# Patient Record
Sex: MALE | Race: BLACK OR AFRICAN AMERICAN | Employment: UNEMPLOYED | URBAN - METROPOLITAN AREA
[De-identification: names, ages, dates, MRNs, and addresses within clinical notes are randomized per-mention and may not be internally consistent; named-entity substitution may affect disease eponyms.]

---

## 2018-04-02 PROBLEM — J45.20 MILD INTERMITTENT ASTHMA WITHOUT COMPLICATION: Status: ACTIVE | Noted: 2018-04-02

## 2018-04-02 PROBLEM — I10 ESSENTIAL HYPERTENSION: Status: ACTIVE | Noted: 2018-04-02

## 2018-04-02 PROBLEM — Z86.73 HISTORY OF CVA (CEREBROVASCULAR ACCIDENT): Status: ACTIVE | Noted: 2018-04-02

## 2018-04-02 PROBLEM — Z86.73 HISTORY OF STROKE: Status: ACTIVE | Noted: 2018-04-02

## 2018-04-02 PROBLEM — Z86.73 HISTORY OF CVA (CEREBROVASCULAR ACCIDENT): Status: RESOLVED | Noted: 2018-04-02 | Resolved: 2018-04-02

## 2018-06-20 ENCOUNTER — APPOINTMENT (OUTPATIENT)
Dept: GENERAL RADIOLOGY | Age: 38
End: 2018-06-20
Payer: COMMERCIAL

## 2018-06-20 ENCOUNTER — HOSPITAL ENCOUNTER (EMERGENCY)
Age: 38
Discharge: HOME | End: 2018-06-20
Attending: EMERGENCY MEDICINE
Payer: COMMERCIAL

## 2018-06-20 VITALS
DIASTOLIC BLOOD PRESSURE: 95 MMHG | OXYGEN SATURATION: 97 % | WEIGHT: 178 LBS | SYSTOLIC BLOOD PRESSURE: 130 MMHG | HEART RATE: 82 BPM | RESPIRATION RATE: 16 BRPM | BODY MASS INDEX: 22.13 KG/M2 | TEMPERATURE: 97.5 F | HEIGHT: 75 IN

## 2018-06-20 DIAGNOSIS — S80.12XA CONTUSION OF LEFT LOWER EXTREMITY, INITIAL ENCOUNTER: Primary | ICD-10-CM

## 2018-06-20 PROCEDURE — 73590 X-RAY EXAM OF LOWER LEG: CPT

## 2018-06-20 PROCEDURE — 73610 X-RAY EXAM OF ANKLE: CPT

## 2018-06-20 PROCEDURE — 99283 EMERGENCY DEPT VISIT LOW MDM: CPT

## 2018-06-20 PROCEDURE — 6370000000 HC RX 637 (ALT 250 FOR IP): Performed by: EMERGENCY MEDICINE

## 2018-06-20 RX ORDER — IBUPROFEN 800 MG/1
800 TABLET ORAL ONCE
Status: COMPLETED | OUTPATIENT
Start: 2018-06-20 | End: 2018-06-20

## 2018-06-20 RX ORDER — IBUPROFEN 800 MG/1
800 TABLET ORAL EVERY 8 HOURS PRN
Qty: 30 TABLET | Refills: 0 | Status: SHIPPED | OUTPATIENT
Start: 2018-06-20 | End: 2019-08-14

## 2018-06-20 RX ADMIN — IBUPROFEN 800 MG: 800 TABLET ORAL at 22:54

## 2018-06-20 ASSESSMENT — PAIN SCALES - GENERAL
PAINLEVEL_OUTOF10: 8
PAINLEVEL_OUTOF10: 8
PAINLEVEL_OUTOF10: 5

## 2018-06-23 PROBLEM — Z91.199 MEDICALLY NONCOMPLIANT: Status: ACTIVE | Noted: 2018-06-23

## 2018-06-23 PROBLEM — E78.00 HIGH CHOLESTEROL: Status: ACTIVE | Noted: 2018-06-23

## 2019-08-12 ENCOUNTER — TELEPHONE (OUTPATIENT)
Dept: ADMINISTRATIVE | Age: 39
End: 2019-08-12

## 2019-08-14 ENCOUNTER — OFFICE VISIT (OUTPATIENT)
Dept: PRIMARY CARE CLINIC | Age: 39
End: 2019-08-14
Payer: COMMERCIAL

## 2019-08-14 VITALS
WEIGHT: 185 LBS | DIASTOLIC BLOOD PRESSURE: 82 MMHG | OXYGEN SATURATION: 97 % | SYSTOLIC BLOOD PRESSURE: 120 MMHG | HEIGHT: 75 IN | HEART RATE: 76 BPM | BODY MASS INDEX: 23 KG/M2

## 2019-08-14 DIAGNOSIS — S93.402D SPRAIN OF LEFT ANKLE, UNSPECIFIED LIGAMENT, SUBSEQUENT ENCOUNTER: Primary | ICD-10-CM

## 2019-08-14 DIAGNOSIS — M79.672 PAIN OF LEFT HEEL: ICD-10-CM

## 2019-08-14 PROCEDURE — 99213 OFFICE O/P EST LOW 20 MIN: CPT | Performed by: FAMILY MEDICINE

## 2019-08-14 RX ORDER — IBUPROFEN 200 MG
600 TABLET ORAL EVERY 6 HOURS PRN
COMMUNITY
End: 2020-04-24 | Stop reason: ALTCHOICE

## 2019-08-14 RX ORDER — ACETAMINOPHEN 500 MG
1000 TABLET ORAL EVERY 6 HOURS PRN
COMMUNITY
End: 2020-04-24 | Stop reason: ALTCHOICE

## 2019-08-14 RX ORDER — IBUPROFEN 800 MG/1
800 TABLET ORAL
Qty: 90 TABLET | Refills: 0 | Status: SHIPPED | OUTPATIENT
Start: 2019-08-14 | End: 2020-04-24 | Stop reason: ALTCHOICE

## 2019-08-14 SDOH — HEALTH STABILITY: MENTAL HEALTH: HOW MANY STANDARD DRINKS CONTAINING ALCOHOL DO YOU HAVE ON A TYPICAL DAY?: 3 OR 4

## 2019-08-14 ASSESSMENT — PATIENT HEALTH QUESTIONNAIRE - PHQ9
2. FEELING DOWN, DEPRESSED OR HOPELESS: 0
SUM OF ALL RESPONSES TO PHQ9 QUESTIONS 1 & 2: 0
1. LITTLE INTEREST OR PLEASURE IN DOING THINGS: 0
SUM OF ALL RESPONSES TO PHQ QUESTIONS 1-9: 0
SUM OF ALL RESPONSES TO PHQ QUESTIONS 1-9: 0

## 2019-08-14 NOTE — PATIENT INSTRUCTIONS
Patient Education        Heel Pain: Care Instructions  Your Care Instructions  You can have heel pain from an injury or from everyday overuse, such as running or walking a lot. Plantar fasciitis is the most common cause of heel pain. In this condition, the bottom of your foot from the front of the heel to the base of the toes is sore and hard to walk on. Your heel can get better with rest, anti-inflammatory pain medicines, and stretching exercises. Follow-up care is a key part of your treatment and safety. Be sure to make and go to all appointments, and call your doctor if you are having problems. It's also a good idea to know your test results and keep a list of the medicines you take. How can you care for yourself at home? · Rest your feet often. Reduce your activity to a level that lets you avoid pain. If possible, do not run or walk on hard surfaces. · Take anti-inflammatory medicines to reduce heel pain. These include ibuprofen (Advil, Motrin) and naproxen (Aleve). Read and follow all instructions on the label. · Put ice or a cold pack on your heel for 10 to 20 minutes at a time. Try to do this every 1 to 2 hours for the next 3 days (when you are awake). Put a thin cloth between the ice and your skin. · If ice isn't helping after 2 or 3 days, try heat, such as a heating pad set on low. · If your doctor says it is okay, try these calf stretches. Tight calf muscles can cause heel pain or make it worse. ? Stand about 1 foot from a wall. Place the palms of both hands against the wall at chest level and lean forward against the wall. Put the leg you want to stretch about a step behind your other leg. Keep your back heel on the floor and bend your front knee until you feel a stretch in the back leg. Hold this position for 15 to 30 seconds. Repeat the exercise 2 to 4 times a session. Do 3 to 4 sessions a day. ? Sit down on the floor or a mat with your feet stretched in front of you.  Roll up a towel lengthwise, and loop it over the ball of your foot. Holding the towel at both ends, gently pull the towel toward you to stretch your foot. Hold this position for 15 to 30 seconds. Repeat the exercise 2 to 4 times a session. Do 3 to 4 sessions a day. · Wear a night splint if your doctor suggests it. A night splint holds your foot with the toes pointed up. This position gives the bottom of your foot a constant, gentle stretch. · Wear shoes with good arch support. Athletic shoes or shoes with a well-cushioned sole are good choices. · Try a heel lift, heel cup or shoe insert (orthotic) to help cushion your heel. You can buy these at many shoe stores. Use them in both shoes, even if only one foot hurts. · Maintain a healthy weight. This puts less strain on your feet. When should you call for help? Call your doctor now or seek immediate medical care if:    · You have heel pain with fever, redness, or warmth in your heel.     · You have numbness or tingling in your heel.    Watch closely for changes in your health, and be sure to contact your doctor if:    · You cannot put weight on the sore foot.     · Your heel pain lasts more than 2 weeks. Where can you learn more? Go to https://[employer]. org and sign in to your [employer] account. Enter S299 in the [facility] box to learn more about \"Heel Pain: Care Instructions. \"     If you do not have an account, please click on the \"Sign Up Now\" link. Current as of: September 23, 2018  Content Version: 12.1  © 0194-8932 [employer]. Care instructions adapted under license by [facility] ([facility]). If you have questions about a medical condition or this instruction, always ask your healthcare professional. [place] [address] any warranty or liability for your use of this information. Patient Education        Ankle Sprain: Rehab Exercises  Introduction  Here are some examples of exercises for you to try.  The exercises and relax. Repeat 8 to 12 times. 3. Then place the heel of your other foot on top of the injured one. Push down with the top heel while trying to push up with your injured foot. Hold for about 6 seconds, and relax. Repeat 8 to 12 times. Resisted ankle inversion    1. Sit on the floor with your good leg crossed over your other leg. 2. Hold both ends of an exercise band and loop the band around the inside of your affected foot. Then press your other foot against the band. 3. Keeping your legs crossed, slowly push your affected foot against the band so that foot moves away from your other foot. Then slowly relax. 4. Repeat 8 to 12 times. Resisted ankle eversion    1. Sit on the floor with your legs straight. 2. Hold both ends of an exercise band and loop the band around the outside of your affected foot. Then press your other foot against the band. 3. Keeping your leg straight, slowly push your affected foot outward against the band and away from your other foot without letting your leg rotate. Then slowly relax. 4. Repeat 8 to 12 times. Resisted ankle dorsiflexion    1. Tie the ends of an exercise band together to form a loop. Attach one end of the loop to a secure object or shut a door on it to hold it in place. (Or you can have someone hold one end of the loop to provide resistance.)  2. While sitting on the floor or in a chair, loop the other end of the band over the top of your affected foot. 3. Keeping your knee and leg straight, slowly flex your foot to pull back on the exercise band, and then slowly relax. 4. Repeat 8 to 12 times. Single-leg balance    1. Stand on a flat surface with your arms stretched out to your sides like you are making the letter \"T. \" Then lift your good leg off the floor, bending it at the knee. If you are not steady on your feet, use one hand to hold on to a chair, counter, or wall. 2. Standing on the leg with your affected ankle, keep that knee straight.  Try to range from mild to severe. The more signs of this disorder you have, the more severe it may be. Moderate to severe alcohol use disorder is sometimes called addiction. People who have it may find it hard to control their use of alcohol. People who have this disorder may argue with others about how much they're drinking. Their job may be affected because of drinking. They may drink when it's dangerous or illegal, such as when they drive. They also may have a strong need, or craving, to drink. They may feel like they must drink just to get by. Their drinking may increase their risk of getting hurt or being in a car crash. Over time, drinking too much alcohol may cause health problems. These may include high blood pressure, liver problems, or problems with digestion. What are the signs? Maybe you've wondered about your alcohol habits, or how to tell if your drinking is becoming a problem. Here are some of the signs of alcohol use disorder. You may have it if you have two or more of the following signs:  · You drink larger amounts of alcohol than you ever meant to. Or you've been drinking for a longer time than you ever meant to. · You can't cut down or control your use. Or you constantly wish you could cut down. · You spend a lot of time getting or drinking alcohol or recovering from its effects. · You have strong cravings for alcohol. · You can no longer do your main jobs at work, at school, or at home. · You keep drinking alcohol, even though your use hurts your relationships. · You have stopped doing important activities because of your alcohol use. · You drink alcohol in situations where doing so is dangerous. · You keep drinking alcohol even though you know it's causing health problems. · You need more and more alcohol to get the same effect, or you get less effect from the same amount over time. This is called tolerance. · You have uncomfortable symptoms when you stop drinking alcohol or use less.

## 2019-08-14 NOTE — PROGRESS NOTES
follow-ups on file. Orders Placed This Encounter   Procedures    XR CALCANEUS LEFT (MIN 2 VIEWS)     Standing Status:   Future     Standing Expiration Date:   8/14/2020     Order Specific Question:   Reason for exam:     Answer:   continue pain after injury    XR FOOT LEFT (2 VIEWS)     Standing Status:   Future     Standing Expiration Date:   8/14/2020     Order Specific Question:   Reason for exam:     Answer:   attention left 5th MT    Aircast Air-Stirrup Ankle Splint     Patient was prescribed a Aircast Air Stirrup Ankle Brace. The left ankle will require stabilization / immobilization from this semi-rigid / rigid orthosis to improve their function. The orthosis will assist in protecting the affected area, provide functional support and facilitate healing. The patient was educated and fit by a healthcare professional with expert knowledge and specialization in brace application while under the direct supervision of the treating physician. Verbal and written instructions for the use of and application of this item were provided. They were instructed to contact the office immediately should the brace result in increased pain, decreased sensation, increased swelling or worsening of the condition. Orders Placed This Encounter   Medications    ibuprofen (ADVIL;MOTRIN) 800 MG tablet     Sig: Take 1 tablet by mouth 3 times daily (with meals)     Dispense:  90 tablet     Refill:  0       Patient given educational materials - see patient instructions. Discussed use, benefit, and side effects of prescribed medications. All patient questions answered. Pt voiced understanding. Reviewed health maintenance. .  Patient agreed with treatment plan. Follow up as directed.      Electronicallysigned by Say Moran MD on 8/14/2019 at 10:48 AM

## 2019-12-12 ENCOUNTER — TELEPHONE (OUTPATIENT)
Dept: PRIMARY CARE CLINIC | Age: 39
End: 2019-12-12

## 2020-03-23 ENCOUNTER — TELEPHONE (OUTPATIENT)
Dept: PRIMARY CARE CLINIC | Age: 40
End: 2020-03-23

## 2020-03-23 NOTE — TELEPHONE ENCOUNTER
Pts wife is calling and states the patient is filling for FMLA due to COVID-19. Pt has a hx of asthma and strokes. Pt wants to know if you will fill out the FMLA and if they need to make a appt. Please advise.

## 2020-03-24 NOTE — TELEPHONE ENCOUNTER
He does not need FMLA for asthma or stroke  He doesn't take prevention meds for asthma and had no sequela issues from his stroke ( no disability)

## 2020-04-07 NOTE — TELEPHONE ENCOUNTER
Pt's wife called regarding previous message about  filing for FMLA, she states he does not need FMLA or disability, she was unsure at the time what he would for covid. she is very worried that since pt works for [employer] and 1 of their daughters has asthma and the other child has cerebral palsy & epilepsy and is fearful he is going to bring it home to their children. She is asking if he can have a work note to be off work for the next 3 weeks during the lock down to reduce the possible exposure for their family.        Please advise      Call pt's spouse at [phone]

## 2020-04-23 ENCOUNTER — TELEPHONE (OUTPATIENT)
Dept: PRIMARY CARE CLINIC | Age: 40
End: 2020-04-23

## 2020-04-23 NOTE — TELEPHONE ENCOUNTER
Pt's wife called expressing severe concern for pt, she states in dec 2019 he went to [facility] ER for vomiting blood clots, he has a lump on side of abd by ribs, she states looks like rib is sticking out but was told it is not rib bone, H did do bx of lump and was never advised of results. She is wondering if you could look at path report scanned in under lab tab,   She also is concerned because the area of the lump has multiple bruises and painful. Pt has been heavily drinking since December and wife tried to discuss drinking with him but now he is hiding it from her so she doesn't know. She states he drinks vodka and is not sure how much a day, she finds empty bottles sometimes. Pt refuses to go to hospital in fear of being admitted, will not go get colonoscopy that he was advised to do for the blood in stool, She states he is turning into a different person she doesn't even know who he is anymore. She is worried it is going to stress out her children with his drinking. Has a hx of stroke and blood clots, family hx of heavy alcohol use. She believes he is trying to mask his health fears by increased drinking so he does not have to deal with them. She is wondering if you could call pt and try to coax him into coming into office to apt, pt does not know wife called to address issues, states maybe somehow tell him you are calling to do a f/u on something like BW, or to check in and see if he did colonoscopy and or something to get him into the office to make him realize the severity of health issues.  (without making it obvious wife called us) states maybe if he hears it from you and not her he will take it seriously     Please advise     Please call pt at [phone]

## 2020-04-23 NOTE — TELEPHONE ENCOUNTER
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 ( December 2019)  Call patient : he should make f/u appointment re: stomach issues from [facility]. negative...

## 2020-04-24 ENCOUNTER — TELEPHONE (OUTPATIENT)
Dept: PRIMARY CARE CLINIC | Age: 40
End: 2020-04-24

## 2020-04-24 ENCOUNTER — OFFICE VISIT (OUTPATIENT)
Dept: PRIMARY CARE CLINIC | Age: 40
End: 2020-04-24
Payer: COMMERCIAL

## 2020-04-24 VITALS
DIASTOLIC BLOOD PRESSURE: 108 MMHG | SYSTOLIC BLOOD PRESSURE: 148 MMHG | BODY MASS INDEX: 24.42 KG/M2 | HEART RATE: 108 BPM | WEIGHT: 195.4 LBS | TEMPERATURE: 98.3 F | OXYGEN SATURATION: 97 %

## 2020-04-24 LAB
A/G RATIO: NORMAL
ALBUMIN SERPL-MCNC: NORMAL G/DL
ALP BLD-CCNC: NORMAL U/L
ALT SERPL-CCNC: NORMAL U/L
AST SERPL-CCNC: NORMAL U/L
BASOPHILS ABSOLUTE: NORMAL
BASOPHILS RELATIVE PERCENT: NORMAL
BILIRUB SERPL-MCNC: NORMAL MG/DL
BILIRUBIN DIRECT: NORMAL
BILIRUBIN, INDIRECT: NORMAL
BUN BLDV-MCNC: NORMAL MG/DL
CALCIUM SERPL-MCNC: NORMAL MG/DL
CHLORIDE BLD-SCNC: NORMAL MMOL/L
CO2: NORMAL
CREAT SERPL-MCNC: NORMAL MG/DL
EOSINOPHILS ABSOLUTE: NORMAL
EOSINOPHILS RELATIVE PERCENT: NORMAL
GFR CALCULATED: NORMAL
GLOBULIN: NORMAL
GLUCOSE BLD-MCNC: NORMAL MG/DL
HCT VFR BLD CALC: NORMAL %
HEMOGLOBIN: NORMAL
INR BLD: NORMAL
LYMPHOCYTES ABSOLUTE: NORMAL
LYMPHOCYTES RELATIVE PERCENT: NORMAL
MCH RBC QN AUTO: NORMAL PG
MCHC RBC AUTO-ENTMCNC: NORMAL G/DL
MCV RBC AUTO: NORMAL FL
MONOCYTES ABSOLUTE: NORMAL
MONOCYTES RELATIVE PERCENT: NORMAL
NEUTROPHILS ABSOLUTE: NORMAL
NEUTROPHILS RELATIVE PERCENT: NORMAL
PLATELET # BLD: NORMAL 10*3/UL
PMV BLD AUTO: NORMAL FL
POTASSIUM SERPL-SCNC: NORMAL MMOL/L
PROTEIN TOTAL: NORMAL
PROTIME: NORMAL
RBC # BLD: NORMAL 10*6/UL
SODIUM BLD-SCNC: NORMAL MMOL/L
WBC # BLD: NORMAL 10*3/UL

## 2020-04-24 PROCEDURE — 99214 OFFICE O/P EST MOD 30 MIN: CPT | Performed by: FAMILY MEDICINE

## 2020-04-24 RX ORDER — LORAZEPAM 0.5 MG/1
0.5 TABLET ORAL EVERY 4 HOURS PRN
Qty: 25 TABLET | Refills: 0 | Status: SHIPPED | OUTPATIENT
Start: 2020-04-24 | End: 2020-04-27 | Stop reason: SDUPTHER

## 2020-04-24 RX ORDER — PANTOPRAZOLE SODIUM 40 MG/1
40 TABLET, DELAYED RELEASE ORAL
Qty: 90 TABLET | Refills: 0 | Status: SHIPPED | OUTPATIENT
Start: 2020-04-24 | End: 2021-06-03

## 2020-04-24 RX ORDER — ALBUTEROL SULFATE 90 UG/1
2 AEROSOL, METERED RESPIRATORY (INHALATION) EVERY 6 HOURS PRN
Qty: 1 INHALER | Refills: 3 | Status: SHIPPED | OUTPATIENT
Start: 2020-04-24

## 2020-04-24 ASSESSMENT — ENCOUNTER SYMPTOMS
BLOOD IN STOOL: 1
SHORTNESS OF BREATH: 1

## 2020-04-24 ASSESSMENT — PATIENT HEALTH QUESTIONNAIRE - PHQ9: DEPRESSION UNABLE TO ASSESS: URGENT/EMERGENT SITUATION

## 2020-04-24 NOTE — PROGRESS NOTES
Aggie Worley a 42 y.o. male who presents today for his medical conditions/complaints as notedbelow. Chief Complaint   Patient presents with    Other     Pt has bruising on the right side of his ribs off and on x5 months. Pt states its painful to touch. HPI:   HPI  Right lateral ribs off and on, lump and bruising comes and goes but the lump stays  Lump is tender sometimes x 2 years  Spouse present: she is worried about his liver    SOB off and on due to asthma and allergies. Chest felt hot a couple of days ago  Uses albuterol prn. No chest pain . Does get chest tightness. Reviewed [facility] records  He only took protonix for a week after d/c    LDL Cholesterol (mg/dL)   Date Value   09/12/2014 111     LDL Calculated (mg/dL)   Date Value   04/09/2018 195 (A)       (goal LDL is <100)   AST (U/L)   Date Value   09/10/2014 44 (H)     ALT (U/L)   Date Value   09/10/2014 38     BUN (mg/dL)   Date Value   09/10/2014 20     BP Readings from Last 3 Encounters:   04/24/20 (!) 148/108   08/14/19 120/82   06/21/18 124/86          (goal 120/80)    Past Medical History:   Diagnosis Date    Asthma     Essential hypertension 4/2/2018    High cholesterol 6/23/2018    Seasonal allergies       Past Surgical History:   Procedure Laterality Date    TRANSESOPHAGEAL ECHOCARDIOGRAM  9/13/14       Family History   Problem Relation Age of Onset    Stroke Maternal Grandmother         cousin    High Blood Pressure Maternal Grandmother     Cancer Other         G. Mother , Cervix    Alcohol Abuse Father         G. Parents, eTrrancelupe Davis. Social History     Tobacco Use    Smoking status: Never Smoker    Smokeless tobacco: Never Used   Substance Use Topics    Alcohol use: Yes     Drinks per session: 3 or 4     Comment: 5 times a week.  large amounts vodka per spouse      Current Outpatient Medications   Medication Sig Dispense Refill    albuterol sulfate HFA (PROAIR HFA) 108 (90 Base) MCG/ACT inhaler Inhale 2 puffs into the lungs every 6 hours as needed for Wheezing or Shortness of Breath 1 Inhaler 3    metoprolol tartrate (LOPRESSOR) 25 MG tablet Take 0.5 tablets by mouth 2 times daily 60 tablet 5    LORazepam (ATIVAN) 0.5 MG tablet Take 1 tablet by mouth every 4 hours as needed for Anxiety (tremors) for up to 5 days. 25 tablet 0    pantoprazole (PROTONIX) 40 MG tablet Take 1 tablet by mouth every morning (before breakfast) 90 tablet 0     No current facility-administered medications for this visit. No Known Allergies    Health Maintenance   Topic Date Due    Varicella vaccine (1 of 2 - 2-dose childhood series) 11/11/1983    Pneumococcal 0-64 years Vaccine (1 of 1 - PPSV23) 11/11/1988    HIV screen  11/11/1997    DTaP/Tdap/Td vaccine (1 - Tdap) 11/11/2001    Potassium monitoring  04/09/2019    Creatinine monitoring  04/09/2019    Flu vaccine (Season Ended) 09/01/2020    Hepatitis A vaccine  Aged Out    Hepatitis B vaccine  Aged Out    Hib vaccine  Aged Out    Meningococcal (ACWY) vaccine  Aged Out       Subjective:      Review of Systems   HENT: Positive for nosebleeds. Respiratory: Positive for shortness of breath. Gastrointestinal: Positive for blood in stool. Poor appetite  Drinks vodka 1 fifth to 1/2 gallon per week    Objective:     BP (!) 148/108   Pulse 108   Temp 98.3 °F (36.8 °C)   Wt 195 lb 6.4 oz (88.6 kg)   SpO2 97%   BMI 24.42 kg/m²   Physical Exam  Constitutional:       Appearance: He is diaphoretic (mild diaphoresis). HENT:      Head: Normocephalic and atraumatic. Mouth/Throat:      Mouth: Mucous membranes are moist.   Eyes:      Pupils: Pupils are equal, round, and reactive to light. Cardiovascular:      Rate and Rhythm: Regular rhythm. Tachycardia present. Heart sounds: Normal heart sounds. Pulmonary:      Effort: Pulmonary effort is normal. No respiratory distress. Breath sounds: Normal breath sounds. No stridor.    Abdominal:      General: Bowel sounds are normal. There is no distension. Palpations: There is no mass. Tenderness: There is no abdominal tenderness. Musculoskeletal:      Right lower leg: No edema. Left lower leg: No edema. Lymphadenopathy:      Cervical: No cervical adenopathy. Skin:     Capillary Refill: Capillary refill takes less than 2 seconds. Findings: Rash present. Comments: 12 cm x 8  Cm  And a 5 cm bruised swollen area: right lateral chest. It feels like irregular raised small bumps and swollen background. It almost feels vascular like hemangiomas. It's tender  Mildly Bruised swollen area right subscapular area about 3-4 cm. Neurological:      Mental Status: He is alert and oriented to person, place, and time. Comments: Is quiet and subdued. Muscle tremors in upper arms and lip. Psychiatric:         Behavior: Behavior normal.         Thought Content: Thought content normal.       Assessment:       Diagnosis Orders   1. Abnormal bruising  CT CHEST W CONTRAST    CBC    Protime-INR    APTT    Hepatic Function Panel    Basic Metabolic Panel    XR RIBS RIGHT (2 VIEWS)   2. Mild intermittent asthma without complication  albuterol sulfate HFA (PROAIR HFA) 108 (90 Base) MCG/ACT inhaler   3. Other chest pain  CT CHEST W CONTRAST    XR RIBS RIGHT (2 VIEWS)   4. Alcohol withdrawal syndrome with complication (HCC)  metoprolol tartrate (LOPRESSOR) 25 MG tablet    LORazepam (ATIVAN) 0.5 MG tablet   5. Chronic gastritis without bleeding, unspecified gastritis type  pantoprazole (PROTONIX) 40 MG tablet        Plan:      Return in about 3 days (around 4/27/2020) for right chest pain. asthma, med check with alcohol withdrawal. .  Needs OV on Monday to recheck alcohol withdrawal sx and BP  Discussed with his wife that she will have to keep track of the Ativan dose.     Orders Placed This Encounter   Procedures    CT CHEST W CONTRAST     Standing Status:   Future     Standing Expiration Date:   4/24/2021  XR RIBS RIGHT (2 VIEWS)     Standing Status:   Future     Standing Expiration Date:   4/24/2021    CBC     Standing Status:   Future     Standing Expiration Date:   4/24/2021    Protime-INR     Standing Status:   Future     Standing Expiration Date:   4/24/2021     Order Specific Question:   Daily Coumadin Dose? Answer:   none    APTT     Standing Status:   Future     Standing Expiration Date:   4/24/2021     Order Specific Question:   Daily Heparin Dose? Answer:   none    Hepatic Function Panel     Standing Status:   Future     Standing Expiration Date:   4/24/2021    Basic Metabolic Panel     Standing Status:   Future     Standing Expiration Date:   4/25/2021     Orders Placed This Encounter   Medications    albuterol sulfate HFA (PROAIR HFA) 108 (90 Base) MCG/ACT inhaler     Sig: Inhale 2 puffs into the lungs every 6 hours as needed for Wheezing or Shortness of Breath     Dispense:  1 Inhaler     Refill:  3    metoprolol tartrate (LOPRESSOR) 25 MG tablet     Sig: Take 0.5 tablets by mouth 2 times daily     Dispense:  60 tablet     Refill:  5    LORazepam (ATIVAN) 0.5 MG tablet     Sig: Take 1 tablet by mouth every 4 hours as needed for Anxiety (tremors) for up to 5 days. Dispense:  25 tablet     Refill:  0    pantoprazole (PROTONIX) 40 MG tablet     Sig: Take 1 tablet by mouth every morning (before breakfast)     Dispense:  90 tablet     Refill:  0       Patient given educational materials - see patient instructions. Discussed use, benefit, and side effects of prescribed medications. All patient questions answered. Pt voiced understanding. Reviewed health maintenance. Instructed to continue current medications, diet and exercise. Patient agreed with treatment plan. Follow up as directed.      Electronicallysigned by Madhu Whitten MD on 4/24/2020 at 2:37 PM

## 2020-04-27 ENCOUNTER — OFFICE VISIT (OUTPATIENT)
Dept: PRIMARY CARE CLINIC | Age: 40
End: 2020-04-27
Payer: COMMERCIAL

## 2020-04-27 VITALS
SYSTOLIC BLOOD PRESSURE: 118 MMHG | DIASTOLIC BLOOD PRESSURE: 76 MMHG | OXYGEN SATURATION: 97 % | BODY MASS INDEX: 23.7 KG/M2 | HEART RATE: 96 BPM | WEIGHT: 189.6 LBS | TEMPERATURE: 98.6 F

## 2020-04-27 PROCEDURE — 99213 OFFICE O/P EST LOW 20 MIN: CPT | Performed by: FAMILY MEDICINE

## 2020-04-27 RX ORDER — LORAZEPAM 1 MG/1
1 TABLET ORAL EVERY 4 HOURS PRN
Qty: 25 TABLET | Refills: 0 | Status: SHIPPED | OUTPATIENT
Start: 2020-04-27 | End: 2020-05-07

## 2020-04-27 ASSESSMENT — ENCOUNTER SYMPTOMS
VOMITING: 0
DIARRHEA: 0
COUGH: 1
SHORTNESS OF BREATH: 1
NAUSEA: 0

## 2020-04-27 ASSESSMENT — PATIENT HEALTH QUESTIONNAIRE - PHQ9
SUM OF ALL RESPONSES TO PHQ QUESTIONS 1-9: 0
SUM OF ALL RESPONSES TO PHQ QUESTIONS 1-9: 0
1. LITTLE INTEREST OR PLEASURE IN DOING THINGS: 0
SUM OF ALL RESPONSES TO PHQ9 QUESTIONS 1 & 2: 0
2. FEELING DOWN, DEPRESSED OR HOPELESS: 0

## 2020-04-27 NOTE — PROGRESS NOTES
[address] [facility]  [address] [address]  [facility] [address]  Dept: Pr-2 George Narayan is a 42 y.o. male who presents today for his medical conditions/complaintsas noted below. Chief Complaint   Patient presents with    Chest Pain    Mass     Patient states that he has lumps and brusing on right abdomin. some pain.  Results       HPI:     HPI- Not sleeping well. No alcohol since Wednesday, and shaking may be a little better, but Ativan was not really very helpful. Felt like he was taking a placebo. Had labs done and CT scan. LDL Cholesterol (mg/dL)   Date Value   09/12/2014 111     LDL Calculated (mg/dL)   Date Value   04/09/2018 195 (A)       (goal LDL is <100)   AST (U/L)   Date Value   09/10/2014 44 (H)     ALT (U/L)   Date Value   09/10/2014 38     BUN (mg/dL)   Date Value   09/10/2014 20     BP Readings from Last 3 Encounters:   04/27/20 118/76   04/24/20 (!) 148/108   08/14/19 120/82          (goal 120/80)    Past Medical History:   Diagnosis Date    Asthma     Essential hypertension 4/2/2018    High cholesterol 6/23/2018    Seasonal allergies       Past Surgical History:   Procedure Laterality Date    TRANSESOPHAGEAL ECHOCARDIOGRAM  9/13/14       Family History   Problem Relation Age of Onset    Stroke Maternal Grandmother         cousin    High Blood Pressure Maternal Grandmother     Cancer Other         G. Mother , Cervix    Alcohol Abuse Father         G. Parents, Miguel Alfaro. Social History     Tobacco Use    Smoking status: Never Smoker    Smokeless tobacco: Never Used   Substance Use Topics    Alcohol use: Yes     Drinks per session: 3 or 4     Comment: 5 times a week. large amounts vodka per spouse      Current Outpatient Medications   Medication Sig Dispense Refill    LORazepam (ATIVAN) 1 MG tablet Take 1 tablet by mouth every 4 hours as needed for Anxiety (tremors) for up to 5 days.  25 tablet 0    albuterol erythema. Comments: Hematoma of left ribs   Neurological:      Mental Status: He is alert and oriented to person, place, and time. Comments: Mild tremor noted         Assessment:       Diagnosis Orders   1. Elevated liver enzymes  CBC Auto Differential    Hepatitis Panel, Acute    Vitamin B12 & Folate    Iron    CT ABDOMEN PELVIS W IV CONTRAST Additional Contrast? Radiologist Recommendation   2. Alcohol withdrawal syndrome with complication (HCC)  Vitamin B12 & Folate    Iron    LORazepam (ATIVAN) 1 MG tablet   3. Anemia, unspecified type          Plan:    Check CT abd to look at liver and probable hematoma also. Jeison labs - and add vitamin and hepatitis panel. DW pt that he can increase his ativan to help with sleep until we get him through is withdrawal.    We will call with results of tests an then schedule f/u appt. Return in about 4 weeks (around 5/25/2020) for medication f/u. Orders Placed This Encounter   Procedures    CT ABDOMEN PELVIS W IV CONTRAST Additional Contrast? Radiologist Recommendation     Standing Status:   Future     Standing Expiration Date:   4/28/2021     Order Specific Question:   Additional Contrast?     Answer:   Radiologist Recommendation    CBC Auto Differential     Standing Status:   Future     Standing Expiration Date:   4/28/2021    Hepatitis Panel, Acute     Standing Status:   Future     Standing Expiration Date:   4/27/2021    Vitamin B12 & Folate     Standing Status:   Future     Standing Expiration Date:   4/28/2021    Iron     Standing Status:   Future     Standing Expiration Date:   4/28/2021     Order Specific Question:   Is Patient Fasting? Answer:   no     Order Specific Question:   No of Hours? Answer:   0     Orders Placed This Encounter   Medications    LORazepam (ATIVAN) 1 MG tablet     Sig: Take 1 tablet by mouth every 4 hours as needed for Anxiety (tremors) for up to 5 days.      Dispense:  25 tablet     Refill:  0       Patient given

## 2020-05-06 ENCOUNTER — TELEPHONE (OUTPATIENT)
Dept: PRIMARY CARE CLINIC | Age: 40
End: 2020-05-06

## 2020-05-06 NOTE — TELEPHONE ENCOUNTER
2 ativan could make him act that way. Stop having him take ativan for 4 hrs and see how he acts. The only other thing she could do is have him do a urine screen for alcohol ( it's only good for the next couple of hours ) I can order it at the lab, ut he has to be willing to go in and pee in a cup. Does he have a f/u appointment with a counselor ?

## 2020-05-13 RX ORDER — LORAZEPAM 1 MG/1
1 TABLET ORAL EVERY 4 HOURS PRN
Qty: 25 TABLET | Refills: 0 | OUTPATIENT
Start: 2020-05-13 | End: 2020-05-18

## 2020-06-08 ENCOUNTER — OFFICE VISIT (OUTPATIENT)
Dept: PRIMARY CARE CLINIC | Age: 40
End: 2020-06-08
Payer: COMMERCIAL

## 2020-06-08 VITALS
WEIGHT: 190 LBS | TEMPERATURE: 97.4 F | DIASTOLIC BLOOD PRESSURE: 98 MMHG | SYSTOLIC BLOOD PRESSURE: 142 MMHG | HEART RATE: 118 BPM | BODY MASS INDEX: 23.75 KG/M2 | OXYGEN SATURATION: 96 %

## 2020-06-08 PROCEDURE — 99213 OFFICE O/P EST LOW 20 MIN: CPT | Performed by: NURSE PRACTITIONER

## 2020-06-08 RX ORDER — LORAZEPAM 1 MG/1
1 TABLET ORAL EVERY 6 HOURS PRN
Qty: 25 TABLET | Refills: 0 | Status: SHIPPED | OUTPATIENT
Start: 2020-06-08 | End: 2020-06-15

## 2020-06-08 RX ORDER — DISULFIRAM 500 MG/1
500 TABLET ORAL DAILY
Qty: 30 TABLET | Refills: 0 | Status: SHIPPED | OUTPATIENT
Start: 2020-06-08 | End: 2021-06-03

## 2020-06-08 ASSESSMENT — ENCOUNTER SYMPTOMS
BACK PAIN: 0
COUGH: 0
EYE REDNESS: 0
SHORTNESS OF BREATH: 0
CONSTIPATION: 0
EYE PAIN: 0
DIARRHEA: 0

## 2020-06-08 NOTE — PROGRESS NOTES
were placed in this encounter. Orders Placed This Encounter   Medications    Disulfiram 500 MG TABS     Sig: Take 500 mg by mouth daily     Dispense:  30 tablet     Refill:  0    LORazepam (ATIVAN) 1 MG tablet     Sig: Take 1 tablet by mouth every 6 hours as needed for Anxiety (tremors) for up to 7 days. Dispense:  25 tablet     Refill:  0       Patient given educationalmaterials - see patient instructions. Discussed use, benefit, and side effectsof prescribed medications. All patient questions answered. Pt voiced understanding. Reviewed health maintenance. Instructed to continue current medications, diet andexercise. Patient agreed with treatment plan. Follow up as directed.      Electronicallysigned by AAMIR Patel CNP on 6/8/2020 at 3:20 PM

## 2020-06-08 NOTE — PATIENT INSTRUCTIONS
Take disulfiram one tablet every morning. Take Lorazepam one table every 6 hours as needed for tremors or other withdrawal symptoms. Exercise daily  Patient Education        Alcohol Detoxification and Withdrawal: Care Instructions  Your Care Instructions     If you drink alcohol regularly and then suddenly stop, you may go through some physical and emotional problems while the alcohol clears out of your system. Clearing the alcohol from your body is called detoxification, or detox. Physical and emotional problems that may happen during detox are called withdrawal.  Symptoms of withdrawal can be scary and dangerous. Mild symptoms include nausea and vomiting, sweating, shakiness, and intense worry. Severe symptoms include being confused and irritable, feeling things on your body that are not there, seeing or hearing things that are not there, and trembling. You may even have seizures. If your symptoms become severe you must see a doctor. People who drink large amounts of alcohol should not try to detox at home. A person can die of severe alcohol withdrawal.  Symptoms of alcohol withdrawal may begin from 4 to 12 hours after you stop drinking. But they may not start for several days after the last drink. They can last a few days. It is hard to stop drinking. But when you have cleared the alcohol from your system, you will be able to start the next part of your life, free from the burden of being dependent. Follow-up care is a key part of your treatment and safety. Be sure to make and go to all appointments, and call your doctor if you are having problems. It's also a good idea to know your test results and keep a list of the medicines you take. How can you care for yourself at home? · Before you stop drinking, talk to your doctor about how you plan to stop. Be sure to be completely honest with him or her about how much you have been drinking.  Your doctor will figure out whether you need to detox in a very fast.  · Your heart beats more than 120 times a minute and will not slow down. · You have chest pain. · You have a seizure. · You see or feel things that are not there (hallucinate). If you are caring for someone who is going through detox, call if:  · The person passes out (loses consciousness). · The person sees or feels things that are not there and sees or hears the same things many times. · The person is very agitated and will not calm down. · The person becomes violent or threatens to be violent. · The person has a seizure. Call your doctor now or seek immediate medical care if:  · You have a high fever. · You have severe belly pain. · You are very shaky. Watch closely for changes in your health, and be sure to contact your doctor if:  · You do not get better as expected. Where can you learn more? Go to https://MIGSIFpeFindThatCourse.[employer]. org and sign in to your [employer] account. Enter [phone] in the [employer] box to learn more about \"Alcohol Detoxification and Withdrawal: Care Instructions. \"     If you do not have an account, please click on the \"Sign Up Now\" link. Current as of: August 22, 2019               Content Version: 12.5  © 7906-9375 Healthwise, Incorporated. Care instructions adapted under license by [facility] ([facility]). If you have questions about a medical condition or this instruction, always ask your healthcare professional. [place] [address] any warranty or liability for your use of this information.

## 2020-07-08 ENCOUNTER — TELEPHONE (OUTPATIENT)
Dept: PRIMARY CARE CLINIC | Age: 40
End: 2020-07-08

## 2020-07-08 NOTE — TELEPHONE ENCOUNTER
[facility] Transitions Initial Follow Up Call    Outreach made within 2 business days of discharge: Yes    Patient: Batool Norman Patient : 1980   MRN: [id number]  Reason for Admission: There are no discharge diagnoses documented for the most recent discharge. Discharge Date: 18       Spoke with: No answer- left a VM to have the patient call back    Discharge department/facility: [facility]    Follow Up  No future appointments.     Shereen Lovett MA

## 2020-07-16 NOTE — TELEPHONE ENCOUNTER
Pt calling. Asking if you would call him back? Still having fatigue and head is pounding. Did advise ER if he would get worse. Pt agrees and said he would have someone take him to the er, if needed.

## 2021-06-03 ENCOUNTER — OFFICE VISIT (OUTPATIENT)
Dept: PRIMARY CARE CLINIC | Age: 41
End: 2021-06-03
Payer: COMMERCIAL

## 2021-06-03 VITALS
HEART RATE: 94 BPM | OXYGEN SATURATION: 97 % | DIASTOLIC BLOOD PRESSURE: 98 MMHG | WEIGHT: 188.4 LBS | SYSTOLIC BLOOD PRESSURE: 144 MMHG | BODY MASS INDEX: 23.55 KG/M2

## 2021-06-03 DIAGNOSIS — Z79.899 MEDICATION MANAGEMENT: ICD-10-CM

## 2021-06-03 DIAGNOSIS — I10 ESSENTIAL HYPERTENSION: Primary | ICD-10-CM

## 2021-06-03 DIAGNOSIS — F10.939 ALCOHOL WITHDRAWAL SYNDROME WITH COMPLICATION (HCC): ICD-10-CM

## 2021-06-03 DIAGNOSIS — F41.9 ANXIETY: ICD-10-CM

## 2021-06-03 PROCEDURE — 99213 OFFICE O/P EST LOW 20 MIN: CPT | Performed by: NURSE PRACTITIONER

## 2021-06-03 RX ORDER — CETIRIZINE HYDROCHLORIDE 10 MG/1
10 TABLET ORAL DAILY
COMMUNITY

## 2021-06-03 RX ORDER — CITALOPRAM 10 MG/1
10 TABLET ORAL DAILY
Qty: 30 TABLET | Refills: 3 | Status: ON HOLD | OUTPATIENT
Start: 2021-06-03 | End: 2021-06-26 | Stop reason: HOSPADM

## 2021-06-03 RX ORDER — LORAZEPAM 0.5 MG/1
0.5 TABLET ORAL EVERY 8 HOURS PRN
Qty: 60 TABLET | Refills: 0 | Status: SHIPPED | OUTPATIENT
Start: 2021-06-03 | End: 2021-06-11

## 2021-06-03 SDOH — ECONOMIC STABILITY: FOOD INSECURITY: WITHIN THE PAST 12 MONTHS, YOU WORRIED THAT YOUR FOOD WOULD RUN OUT BEFORE YOU GOT MONEY TO BUY MORE.: NEVER TRUE

## 2021-06-03 SDOH — ECONOMIC STABILITY: FOOD INSECURITY: WITHIN THE PAST 12 MONTHS, THE FOOD YOU BOUGHT JUST DIDN'T LAST AND YOU DIDN'T HAVE MONEY TO GET MORE.: NEVER TRUE

## 2021-06-03 ASSESSMENT — ENCOUNTER SYMPTOMS
SHORTNESS OF BREATH: 0
WHEEZING: 0
NAUSEA: 0
COUGH: 0
DIARRHEA: 0
ABDOMINAL PAIN: 0
VOMITING: 0
SORE THROAT: 0
EYE DISCHARGE: 0
RHINORRHEA: 0
EYE REDNESS: 0

## 2021-06-03 ASSESSMENT — SOCIAL DETERMINANTS OF HEALTH (SDOH): HOW HARD IS IT FOR YOU TO PAY FOR THE VERY BASICS LIKE FOOD, HOUSING, MEDICAL CARE, AND HEATING?: NOT HARD AT ALL

## 2021-06-03 ASSESSMENT — PATIENT HEALTH QUESTIONNAIRE - PHQ9: DEPRESSION UNABLE TO ASSESS: URGENT/EMERGENT SITUATION

## 2021-06-03 NOTE — PROGRESS NOTES
[address] [facility]  [address] [address] [facility] [address]  Dept: Pr-2 George Narayan is a 47 y.o. male Established patient, who presents today for his medical conditions/complaints as noted below. Chief Complaint   Patient presents with    Anxiety     Pt states hes been having anxiety attacks over the last year. HPI:     HPI    He has been having anxiety for about 2 years. He is heart races and feels chest pressure or pain. He worries about things he can not control. He states it does interfere with his life. He has anxiety daily and gets worse at night. He is nervous today just explaining situation. He states he tries to run to help with anxiety and it does help. He does see a therapist. He sees therapist 3 days per week at [facility]. He is in a treatment program for alcohol. He does take cetrizine as needed but not as regular as he states he should. Reviewed prior notes None  Reviewed previous      LDL Cholesterol (mg/dL)   Date Value   09/12/2014 111     LDL Calculated (mg/dL)   Date Value   04/09/2018 195 (A)       (goal LDL is <100)   AST (U/L)   Date Value   09/10/2014 44 (H)     ALT (U/L)   Date Value   09/10/2014 38     BUN (mg/dL)   Date Value   09/10/2014 20     TSH (mIU/L)   Date Value   09/10/2014 1.08     BP Readings from Last 3 Encounters:   06/03/21 (!) 144/98   06/08/20 (!) 142/98   04/27/20 118/76          (goal 120/80)    Past Medical History:   Diagnosis Date    Asthma     Essential hypertension 4/2/2018    High cholesterol 6/23/2018    Seasonal allergies       Past Surgical History:   Procedure Laterality Date    TRANSESOPHAGEAL ECHOCARDIOGRAM  9/13/14       Family History   Problem Relation Age of Onset    Stroke Maternal Grandmother         cousin    High Blood Pressure Maternal Grandmother     Cancer Other         G. Mother , Cervix    Alcohol Abuse Father         G. Parents, Zeke Cao. Social History     Tobacco Use    Smoking status: Never Smoker    Smokeless tobacco: Never Used   Substance Use Topics    Alcohol use: Yes     Comment: Pt states he last drank on 5/30/2021      Current Outpatient Medications   Medication Sig Dispense Refill    cetirizine (ZYRTEC) 10 MG tablet Take 10 mg by mouth daily      metoprolol tartrate (LOPRESSOR) 25 MG tablet Take 0.5 tablets by mouth 2 times daily 60 tablet 0    citalopram (CELEXA) 10 MG tablet Take 1 tablet by mouth daily 30 tablet 3    LORazepam (ATIVAN) 0.5 MG tablet Take 1 tablet by mouth every 8 hours as needed for Anxiety (cravings) for up to 30 days. 60 tablet 0    albuterol sulfate HFA (PROAIR HFA) 108 (90 Base) MCG/ACT inhaler Inhale 2 puffs into the lungs every 6 hours as needed for Wheezing or Shortness of Breath 1 Inhaler 3     No current facility-administered medications for this visit. No Known Allergies    Health Maintenance   Topic Date Due    Hepatitis C screen  Never done    Varicella vaccine (1 of 2 - 2-dose childhood series) Never done    Pneumococcal 0-64 years Vaccine (1 of 2 - PPSV23) Never done    COVID-19 Vaccine (1) Never done    HIV screen  Never done    DTaP/Tdap/Td vaccine (1 - Tdap) Never done    Potassium monitoring  04/24/2021    Creatinine monitoring  04/24/2021    Flu vaccine (Season Ended) 09/01/2021    Hepatitis A vaccine  Aged Out    Hepatitis B vaccine  Aged Out    Hib vaccine  Aged Out    Meningococcal (ACWY) vaccine  Aged Out       Subjective:      Review of Systems   Constitutional: Negative for chills and fever. HENT: Negative for rhinorrhea and sore throat. Eyes: Negative for discharge and redness. Respiratory: Negative for cough, shortness of breath and wheezing. Cardiovascular: Negative for chest pain and palpitations. Gastrointestinal: Negative for abdominal pain, diarrhea, nausea and vomiting. Genitourinary: Negative for dysuria and frequency.    Musculoskeletal: Negative for arthralgias and myalgias. Skin: Negative for rash and wound. Neurological: Negative for dizziness, light-headedness and headaches. Psychiatric/Behavioral: Negative for dysphoric mood and sleep disturbance. The patient is nervous/anxious. Objective:     BP (!) 144/98   Pulse 94   Wt 188 lb 6.4 oz (85.5 kg)   SpO2 97%   BMI 23.55 kg/m²   Physical Exam  Vitals and nursing note reviewed. Constitutional:       General: He is not in acute distress. Appearance: He is well-developed. He is not ill-appearing. HENT:      Head: Normocephalic and atraumatic. Right Ear: External ear normal.      Left Ear: External ear normal.   Eyes:      General: No scleral icterus. Right eye: No discharge. Left eye: No discharge. Conjunctiva/sclera: Conjunctivae normal.      Pupils: Pupils are equal, round, and reactive to light. Neck:      Thyroid: No thyromegaly. Trachea: No tracheal deviation. Cardiovascular:      Rate and Rhythm: Normal rate and regular rhythm. Heart sounds: Normal heart sounds. Comments: No carotid bruit  Pulmonary:      Effort: Pulmonary effort is normal. No respiratory distress. Breath sounds: Normal breath sounds. No wheezing. Abdominal:      General: Bowel sounds are normal.      Palpations: Abdomen is soft. Musculoskeletal:      Right lower leg: No edema. Left lower leg: No edema. Lymphadenopathy:      Cervical: No cervical adenopathy. Skin:     General: Skin is warm. Findings: No rash. Neurological:      Mental Status: He is alert and oriented to person, place, and time. Psychiatric:         Mood and Affect: Mood is anxious. Behavior: Behavior normal.         Thought Content:  Thought content normal.         Cognition and Memory: Cognition and memory normal.     Controlled substances monitoring: possible medication side effects, risk of tolerance and/or dependence, and alternative treatments discussed, no signs of potential drug abuse or diversion identified, OARRS report reviewed today- activity consistent with treatment plan, medication contract signed today and random urine drug screen sent today. Assessment/Plan:   1. Essential hypertension  -     metoprolol tartrate (LOPRESSOR) 25 MG tablet; Take 0.5 tablets by mouth 2 times daily, Disp-60 tablet, R-0Normal  -     Comprehensive Metabolic Panel, Fasting; Future  -     CBC With Auto Differential; Future  2. Alcohol withdrawal syndrome with complication (HCC)  -     LORazepam (ATIVAN) 0.5 MG tablet; Take 1 tablet by mouth every 8 hours as needed for Anxiety (cravings) for up to 30 days. , Disp-60 tablet, R-0Normal  -     Comprehensive Metabolic Panel, Fasting; Future  -     CBC With Auto Differential; Future  3. Anxiety  -     citalopram (CELEXA) 10 MG tablet; Take 1 tablet by mouth daily, Disp-30 tablet, R-3Normal  -     LORazepam (ATIVAN) 0.5 MG tablet; Take 1 tablet by mouth every 8 hours as needed for Anxiety (cravings) for up to 30 days. , Disp-60 tablet, R-0Normal  4. Medication management  -     citalopram (CELEXA) 10 MG tablet; Take 1 tablet by mouth daily, Disp-30 tablet, R-3Normal  -     LORazepam (ATIVAN) 0.5 MG tablet; Take 1 tablet by mouth every 8 hours as needed for Anxiety (cravings) for up to 30 days. , Disp-60 tablet, R-0Normal     Lorazepam 0.5 mg PO 3x/day PRN  Citalopram 10 mg QD  Labs ordered    Return in about 5 weeks (around 7/8/2021) for anxiety Dr. Sheri Pierre or Tasha Beal.     Orders Placed This Encounter   Procedures    Comprehensive Metabolic Panel, Fasting     Standing Status:   Future     Standing Expiration Date:   6/3/2022    CBC With Auto Differential     Standing Status:   Future     Standing Expiration Date:   6/3/2022     Orders Placed This Encounter   Medications    metoprolol tartrate (LOPRESSOR) 25 MG tablet     Sig: Take 0.5 tablets by mouth 2 times daily     Dispense:  60 tablet     Refill:  0    citalopram (CELEXA) 10 MG tablet     Sig: Take 1 tablet by mouth daily     Dispense:  30 tablet     Refill:  3    LORazepam (ATIVAN) 0.5 MG tablet     Sig: Take 1 tablet by mouth every 8 hours as needed for Anxiety (cravings) for up to 30 days. Dispense:  60 tablet     Refill:  0       Patient given educational materials - see patient instructions. Discussed use, benefit, and side effects of prescribed medications. All patient questions answered. Pt voiced understanding. Reviewed health maintenance. Instructed to continue current medications, diet and exercise. Patient agreed with treatment plan. Follow up as directed.      Electronically signed by AAMIR House CNP on 6/3/2021 at 12:15 PM

## 2021-06-11 ENCOUNTER — TELEPHONE (OUTPATIENT)
Dept: PRIMARY CARE CLINIC | Age: 41
End: 2021-06-11

## 2021-06-11 NOTE — PROGRESS NOTES
Reviewed telephone note from spouse. Will remove ativan from med list and send copy to Nessa. He cannot use alcohol and take ativan. She should not let the kids in the car with him.
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
## 2021-06-11 NOTE — TELEPHONE ENCOUNTER
I will remove Ativan from his medication list.  She should not let the kids drive with him in the car if he still drinking alcohol. I will send a copy this message to Governor Greer since he is the one seen the patient currently.

## 2021-06-21 ENCOUNTER — APPOINTMENT (OUTPATIENT)
Dept: CT IMAGING | Age: 41
DRG: 053 | End: 2021-06-21
Payer: MEDICARE

## 2021-06-21 ENCOUNTER — HOSPITAL ENCOUNTER (INPATIENT)
Age: 41
LOS: 5 days | Discharge: HOME | DRG: 053 | End: 2021-06-26
Attending: EMERGENCY MEDICINE | Admitting: FAMILY MEDICINE
Payer: MEDICARE

## 2021-06-21 ENCOUNTER — APPOINTMENT (OUTPATIENT)
Dept: MRI IMAGING | Age: 41
DRG: 053 | End: 2021-06-21
Payer: MEDICARE

## 2021-06-21 DIAGNOSIS — R51.9 NONINTRACTABLE EPISODIC HEADACHE, UNSPECIFIED HEADACHE TYPE: ICD-10-CM

## 2021-06-21 DIAGNOSIS — M62.82 NON-TRAUMATIC RHABDOMYOLYSIS: Primary | ICD-10-CM

## 2021-06-21 DIAGNOSIS — R79.89 ELEVATED LFTS: ICD-10-CM

## 2021-06-21 DIAGNOSIS — R56.9 SEIZURE (HCC): ICD-10-CM

## 2021-06-21 LAB
-: ABNORMAL
ABSOLUTE BANDS #: 0.03 K/UL (ref 0–1)
ABSOLUTE EOS #: 0.28 K/UL (ref 0–0.4)
ABSOLUTE IMMATURE GRANULOCYTE: ABNORMAL K/UL (ref 0–0.3)
ABSOLUTE LYMPH #: 1.5 K/UL (ref 1–4.8)
ABSOLUTE MONO #: 0.43 K/UL (ref 0.1–1.3)
ACETAMINOPHEN LEVEL: <5 UG/ML (ref 10–30)
ALBUMIN SERPL-MCNC: 4.9 G/DL (ref 3.5–5.2)
ALBUMIN/GLOBULIN RATIO: ABNORMAL (ref 1–2.5)
ALP BLD-CCNC: 217 U/L (ref 40–129)
ALT SERPL-CCNC: 194 U/L (ref 5–41)
AMMONIA: 39 UMOL/L (ref 16–60)
AMORPHOUS: ABNORMAL
AMPHETAMINE SCREEN URINE: NEGATIVE
ANION GAP SERPL CALCULATED.3IONS-SCNC: 15 MMOL/L (ref 9–17)
AST SERPL-CCNC: 607 U/L
BACTERIA: ABNORMAL
BANDS: 1 % (ref 0–10)
BARBITURATE SCREEN URINE: NEGATIVE
BASOPHILS # BLD: 0 % (ref 0–2)
BASOPHILS ABSOLUTE: 0 K/UL (ref 0–0.2)
BENZODIAZEPINE SCREEN, URINE: NEGATIVE
BILIRUB SERPL-MCNC: 1.09 MG/DL (ref 0.3–1.2)
BILIRUBIN URINE: ABNORMAL
BUN BLDV-MCNC: 17 MG/DL (ref 6–20)
BUN/CREAT BLD: ABNORMAL (ref 9–20)
BUPRENORPHINE URINE: NORMAL
CALCIUM SERPL-MCNC: 8.6 MG/DL (ref 8.6–10.4)
CANNABINOID SCREEN URINE: NEGATIVE
CASTS UA: ABNORMAL /LPF
CHLORIDE BLD-SCNC: 101 MMOL/L (ref 98–107)
CO2: 25 MMOL/L (ref 20–31)
COCAINE METABOLITE, URINE: NEGATIVE
COLOR: ABNORMAL
COMMENT UA: ABNORMAL
CREAT SERPL-MCNC: 0.71 MG/DL (ref 0.7–1.2)
CRYSTALS, UA: ABNORMAL /HPF
DIFFERENTIAL TYPE: ABNORMAL
EOSINOPHILS RELATIVE PERCENT: 9 % (ref 0–4)
EPITHELIAL CELLS UA: ABNORMAL /HPF
GFR AFRICAN AMERICAN: >60 ML/MIN
GFR NON-AFRICAN AMERICAN: >60 ML/MIN
GFR SERPL CREATININE-BSD FRML MDRD: ABNORMAL ML/MIN/{1.73_M2}
GFR SERPL CREATININE-BSD FRML MDRD: ABNORMAL ML/MIN/{1.73_M2}
GLUCOSE BLD-MCNC: 95 MG/DL (ref 70–99)
GLUCOSE URINE: NEGATIVE
HCT VFR BLD CALC: 38 % (ref 41–53)
HEMOGLOBIN: 12.6 G/DL (ref 13.5–17.5)
IMMATURE GRANULOCYTES: ABNORMAL %
KETONES, URINE: ABNORMAL
LEUKOCYTE ESTERASE, URINE: NEGATIVE
LYMPHOCYTES # BLD: 49 % (ref 24–44)
MAGNESIUM: 1.6 MG/DL (ref 1.6–2.6)
MCH RBC QN AUTO: 26.6 PG (ref 26–34)
MCHC RBC AUTO-ENTMCNC: 33.1 G/DL (ref 31–37)
MCV RBC AUTO: 80.4 FL (ref 80–100)
MDMA URINE: NORMAL
METHADONE SCREEN, URINE: NEGATIVE
METHAMPHETAMINE, URINE: NORMAL
MONOCYTES # BLD: 14 % (ref 1–7)
MORPHOLOGY: NORMAL
MUCUS: ABNORMAL
NITRITE, URINE: NEGATIVE
NRBC AUTOMATED: ABNORMAL PER 100 WBC
NUCLEATED RED BLOOD CELLS: 1 PER 100 WBC
OPIATES, URINE: NEGATIVE
OTHER OBSERVATIONS UA: ABNORMAL
OXYCODONE SCREEN URINE: NEGATIVE
PDW BLD-RTO: 16.2 % (ref 11.5–14.9)
PH UA: 6 (ref 5–8)
PHENCYCLIDINE, URINE: NEGATIVE
PLATELET # BLD: 132 K/UL (ref 150–450)
PLATELET ESTIMATE: ABNORMAL
PMV BLD AUTO: 7.8 FL (ref 6–12)
POTASSIUM SERPL-SCNC: 4 MMOL/L (ref 3.7–5.3)
PROPOXYPHENE, URINE: NORMAL
PROTEIN UA: ABNORMAL
RBC # BLD: 4.73 M/UL (ref 4.5–5.9)
RBC # BLD: ABNORMAL 10*6/UL
RBC UA: ABNORMAL /HPF
RENAL EPITHELIAL, UA: ABNORMAL /HPF
SEG NEUTROPHILS: 27 % (ref 36–66)
SEGMENTED NEUTROPHILS ABSOLUTE COUNT: 0.83 K/UL (ref 1.3–9.1)
SODIUM BLD-SCNC: 141 MMOL/L (ref 135–144)
SPECIFIC GRAVITY UA: 1.03 (ref 1–1.03)
TEST INFORMATION: NORMAL
TOTAL CK: 4582 U/L (ref 39–308)
TOTAL PROTEIN: 8 G/DL (ref 6.4–8.3)
TRICHOMONAS: ABNORMAL
TRICYCLIC ANTIDEPRESSANTS, UR: NORMAL
TURBIDITY: CLEAR
URINE HGB: NEGATIVE
UROBILINOGEN, URINE: NORMAL
WBC # BLD: 3.1 K/UL (ref 3.5–11)
WBC # BLD: ABNORMAL 10*3/UL
WBC UA: ABNORMAL /HPF
YEAST: ABNORMAL

## 2021-06-21 PROCEDURE — 36415 COLL VENOUS BLD VENIPUNCTURE: CPT

## 2021-06-21 PROCEDURE — 6370000000 HC RX 637 (ALT 250 FOR IP): Performed by: FAMILY MEDICINE

## 2021-06-21 PROCEDURE — 80307 DRUG TEST PRSMV CHEM ANLYZR: CPT

## 2021-06-21 PROCEDURE — 80053 COMPREHEN METABOLIC PANEL: CPT

## 2021-06-21 PROCEDURE — 2580000003 HC RX 258: Performed by: EMERGENCY MEDICINE

## 2021-06-21 PROCEDURE — 80143 DRUG ASSAY ACETAMINOPHEN: CPT

## 2021-06-21 PROCEDURE — 85025 COMPLETE CBC W/AUTO DIFF WBC: CPT

## 2021-06-21 PROCEDURE — 99284 EMERGENCY DEPT VISIT MOD MDM: CPT

## 2021-06-21 PROCEDURE — 1200000000 HC SEMI PRIVATE

## 2021-06-21 PROCEDURE — 2580000003 HC RX 258: Performed by: FAMILY MEDICINE

## 2021-06-21 PROCEDURE — 81001 URINALYSIS AUTO W/SCOPE: CPT

## 2021-06-21 PROCEDURE — 82550 ASSAY OF CK (CPK): CPT

## 2021-06-21 PROCEDURE — 96360 HYDRATION IV INFUSION INIT: CPT

## 2021-06-21 PROCEDURE — 83735 ASSAY OF MAGNESIUM: CPT

## 2021-06-21 PROCEDURE — 82140 ASSAY OF AMMONIA: CPT

## 2021-06-21 PROCEDURE — 70450 CT HEAD/BRAIN W/O DYE: CPT

## 2021-06-21 PROCEDURE — 70551 MRI BRAIN STEM W/O DYE: CPT

## 2021-06-21 RX ORDER — 0.9 % SODIUM CHLORIDE 0.9 %
1000 INTRAVENOUS SOLUTION INTRAVENOUS ONCE
Status: COMPLETED | OUTPATIENT
Start: 2021-06-21 | End: 2021-06-21

## 2021-06-21 RX ORDER — SODIUM CHLORIDE 9 MG/ML
INJECTION, SOLUTION INTRAVENOUS CONTINUOUS
Status: DISCONTINUED | OUTPATIENT
Start: 2021-06-21 | End: 2021-06-26 | Stop reason: HOSPADM

## 2021-06-21 RX ORDER — SODIUM CHLORIDE 0.9 % (FLUSH) 0.9 %
5-40 SYRINGE (ML) INJECTION EVERY 12 HOURS SCHEDULED
Status: DISCONTINUED | OUTPATIENT
Start: 2021-06-21 | End: 2021-06-26 | Stop reason: HOSPADM

## 2021-06-21 RX ORDER — ONDANSETRON 4 MG/1
4 TABLET, ORALLY DISINTEGRATING ORAL EVERY 8 HOURS PRN
Status: DISCONTINUED | OUTPATIENT
Start: 2021-06-21 | End: 2021-06-26 | Stop reason: HOSPADM

## 2021-06-21 RX ORDER — SODIUM CHLORIDE 9 MG/ML
25 INJECTION, SOLUTION INTRAVENOUS PRN
Status: DISCONTINUED | OUTPATIENT
Start: 2021-06-21 | End: 2021-06-26 | Stop reason: HOSPADM

## 2021-06-21 RX ORDER — ONDANSETRON 2 MG/ML
4 INJECTION INTRAMUSCULAR; INTRAVENOUS EVERY 6 HOURS PRN
Status: DISCONTINUED | OUTPATIENT
Start: 2021-06-21 | End: 2021-06-26 | Stop reason: HOSPADM

## 2021-06-21 RX ORDER — CITALOPRAM 20 MG/1
10 TABLET ORAL DAILY
Status: DISCONTINUED | OUTPATIENT
Start: 2021-06-21 | End: 2021-06-26 | Stop reason: HOSPADM

## 2021-06-21 RX ORDER — SODIUM CHLORIDE 0.9 % (FLUSH) 0.9 %
5-40 SYRINGE (ML) INJECTION PRN
Status: DISCONTINUED | OUTPATIENT
Start: 2021-06-21 | End: 2021-06-26 | Stop reason: HOSPADM

## 2021-06-21 RX ORDER — ALBUTEROL SULFATE 90 UG/1
2 AEROSOL, METERED RESPIRATORY (INHALATION) EVERY 6 HOURS PRN
Status: DISCONTINUED | OUTPATIENT
Start: 2021-06-21 | End: 2021-06-26 | Stop reason: HOSPADM

## 2021-06-21 RX ORDER — HYDROXYZINE HYDROCHLORIDE 25 MG/1
25 TABLET, FILM COATED ORAL EVERY 4 HOURS PRN
Status: DISCONTINUED | OUTPATIENT
Start: 2021-06-21 | End: 2021-06-26 | Stop reason: HOSPADM

## 2021-06-21 RX ORDER — LORAZEPAM 0.5 MG/1
0.5 TABLET ORAL EVERY 4 HOURS PRN
Status: DISCONTINUED | OUTPATIENT
Start: 2021-06-21 | End: 2021-06-26 | Stop reason: HOSPADM

## 2021-06-21 RX ORDER — ACETAMINOPHEN 325 MG/1
650 TABLET ORAL EVERY 4 HOURS PRN
Status: DISCONTINUED | OUTPATIENT
Start: 2021-06-21 | End: 2021-06-26 | Stop reason: HOSPADM

## 2021-06-21 RX ORDER — CETIRIZINE HYDROCHLORIDE 10 MG/1
10 TABLET ORAL DAILY
Status: DISCONTINUED | OUTPATIENT
Start: 2021-06-21 | End: 2021-06-26 | Stop reason: HOSPADM

## 2021-06-21 RX ADMIN — METOPROLOL TARTRATE 12.5 MG: 25 TABLET, FILM COATED ORAL at 21:12

## 2021-06-21 RX ADMIN — SODIUM CHLORIDE 1000 ML: 9 INJECTION, SOLUTION INTRAVENOUS at 09:35

## 2021-06-21 RX ADMIN — SODIUM CHLORIDE: 9 INJECTION, SOLUTION INTRAVENOUS at 12:58

## 2021-06-21 RX ADMIN — CITALOPRAM HYDROBROMIDE 10 MG: 20 TABLET ORAL at 21:12

## 2021-06-21 RX ADMIN — SODIUM CHLORIDE: 9 INJECTION, SOLUTION INTRAVENOUS at 15:56

## 2021-06-21 RX ADMIN — SODIUM CHLORIDE: 9 INJECTION, SOLUTION INTRAVENOUS at 23:39

## 2021-06-21 RX ADMIN — HYDROXYZINE HYDROCHLORIDE 25 MG: 25 TABLET, FILM COATED ORAL at 23:39

## 2021-06-21 ASSESSMENT — PAIN DESCRIPTION - DESCRIPTORS: DESCRIPTORS: THROBBING

## 2021-06-21 ASSESSMENT — PAIN SCALES - GENERAL: PAINLEVEL_OUTOF10: 8

## 2021-06-21 ASSESSMENT — PAIN DESCRIPTION - ORIENTATION: ORIENTATION: ANTERIOR

## 2021-06-21 ASSESSMENT — PAIN DESCRIPTION - FREQUENCY: FREQUENCY: CONTINUOUS

## 2021-06-21 ASSESSMENT — PAIN DESCRIPTION - LOCATION: LOCATION: HEAD

## 2021-06-21 NOTE — PLAN OF CARE
Problem: Pain:  Goal: Pain level will decrease  Description: Pain level will decrease  Outcome: Ongoing  Goal: Control of acute pain  Description: Control of acute pain  Outcome: Ongoing  Goal: Control of chronic pain  Description: Control of chronic pain  Outcome: Ongoing     Problem: Fluid Volume - Imbalance:  Goal: Absence of imbalanced fluid volume signs and symptoms  Description: Absence of imbalanced fluid volume signs and symptoms  Outcome: Ongoing     Problem: Falls - Risk of:  Goal: Will remain free from falls  Description: Will remain free from falls  Outcome: Ongoing  Goal: Absence of physical injury  Description: Absence of physical injury  Outcome: Ongoing

## 2021-06-21 NOTE — ED NOTES
Patient refuses seizure pads for bed. Patient states that he has had seizures in the past but has not had one in months and is not on medications. Patient made aware that if he would have a seizure, that his arms or legs may get caught in the bed rails. Patient verbalized understanding of teaching.       Karen Pascual RN  06/21/21 0410

## 2021-06-21 NOTE — ED PROVIDER NOTES
EMERGENCY DEPARTMENT ENCOUNTER    Pt Name: Fanny Winters  MRN: [id number]  [place] 1980  Date of evaluation: 6/21/21  CHIEF COMPLAINT       Chief Complaint   Patient presents with    Spasms    Seizures    Headache     HISTORY OF PRESENT ILLNESS     Seizures  Seizure activity on arrival: no    Seizure type:  Grand mal  Preceding symptoms: aura    Episode characteristics: abnormal movements and generalized shaking    Postictal symptoms comment:  Having muscle cramps  Return to baseline: yes    Severity:  Severe  Timing:  Once  Number of seizures this episode:  1  Progression:  Resolved  Context comment:  Hx of epilepsy, not taking his meds, has them at home but just doesn't take them  having severe muscle cramps in legs  He runs a lot  Doesn't eat much per significant other  Bit left side tongue during seizure on saturday  Feels like he is having auras over the weekend, having headaches also        REVIEW OF SYSTEMS     Review of Systems   Neurological: Positive for seizures.      PASTMEDICAL HISTORY     Past Medical History:   Diagnosis Date    Asthma     Essential hypertension 4/2/2018    High cholesterol 6/23/2018    Seasonal allergies      Past Problem List  Patient Active Problem List   Diagnosis Code    Seasonal allergies J30.2    Mild intermittent asthma without complication K28.50    Essential hypertension I10    History of stroke Z86.73    High cholesterol E78.00    Medically noncompliant Z91.19    Rhabdomyolysis M62.82     SURGICAL HISTORY       Past Surgical History:   Procedure Laterality Date    TRANSESOPHAGEAL ECHOCARDIOGRAM  9/13/14     CURRENT MEDICATIONS       Previous Medications    ALBUTEROL SULFATE HFA (PROAIR HFA) 108 (90 BASE) MCG/ACT INHALER    Inhale 2 puffs into the lungs every 6 hours as needed for Wheezing or Shortness of Breath    CETIRIZINE (ZYRTEC) 10 MG TABLET    Take 10 mg by mouth daily    CITALOPRAM (CELEXA) 10 MG TABLET    Take 1 tablet by mouth daily METOPROLOL TARTRATE (LOPRESSOR) 25 MG TABLET    Take 0.5 tablets by mouth 2 times daily     ALLERGIES     has No Known Allergies. FAMILY HISTORY     He indicated that the status of his father is unknown. He indicated that the status of his maternal grandmother is unknown. He indicated that the status of his other is unknown. SOCIAL HISTORY       Social History     Tobacco Use    Smoking status: Never Smoker    Smokeless tobacco: Never Used   Substance Use Topics    Alcohol use: Yes     Comment: Pt states he last drank on 5/30/2021    Drug use: No     PHYSICAL EXAM     INITIAL VITALS: /89   Pulse 75   Temp 98 °F (36.7 °C) (Oral)   Resp 18   Ht 6' 3\" (1.905 m)   Wt 180 lb (81.6 kg)   SpO2 100%   BMI 22.50 kg/m²    Physical Exam  Constitutional:       General: He is not in acute distress. Appearance: Normal appearance. He is well-developed. He is not diaphoretic. HENT:      Head: Normocephalic and atraumatic. Right Ear: External ear normal.      Left Ear: External ear normal.      Nose: Nose normal. No congestion. Mouth/Throat:      Mouth: Mucous membranes are moist.      Pharynx: Oropharynx is clear. Eyes:      General:         Right eye: No discharge. Left eye: No discharge. Conjunctiva/sclera: Conjunctivae normal.      Pupils: Pupils are equal, round, and reactive to light. Neck:      Trachea: No tracheal deviation. Cardiovascular:      Rate and Rhythm: Normal rate and regular rhythm. Pulses: Normal pulses. Heart sounds: Normal heart sounds. Pulmonary:      Effort: Pulmonary effort is normal. No respiratory distress. Breath sounds: Normal breath sounds. No stridor. No wheezing or rales. Abdominal:      Palpations: Abdomen is soft. Tenderness: There is no abdominal tenderness. There is no guarding or rebound. Musculoskeletal:         General: No tenderness or deformity. Normal range of motion.       Cervical back: Normal range of motion and neck supple. Comments: Having muscle spasms in legs during exam   Skin:     General: Skin is warm and dry. Capillary Refill: Capillary refill takes less than 2 seconds. Findings: No erythema or rash. Neurological:      General: No focal deficit present. Mental Status: He is alert and oriented to person, place, and time. Cranial Nerves: No cranial nerve deficit. Coordination: Coordination normal.      Comments: GCS 15  Strength in arms 5/5  Strength in legs 5/5  Sensation intact bl arms and legs  No facial droop  Speech is clear, no dysarthria or aphasia  No ataxia in arms or legs  No gaze preference or nystagums  Visual fields intact     Psychiatric:         Mood and Affect: Mood normal.         Behavior: Behavior normal.         Thought Content: Thought content normal.         Judgment: Judgment normal.         MEDICAL DECISION MAKING:       ED Course as of Jun 21 1257   Mon Jun 21, 2021   1225 DW Dr Feliberto Ruffin for admit  Ordered MRI brain without contrast for his chronic headaches    [WM]   46 Admitting for iv fluids for rhabdomyolysis    [WM]      ED Course User Index  [WM] Kerwin Olmedo MD       Procedures    DIAGNOSTIC RESULTS     RADIOLOGY:All plain film, CT, MRI, and formal ultrasound images (except ED bedside ultrasound) are read by the radiologist, see reports below, unless otherwisenoted in MDM or here. CT HEAD WO CONTRAST   Final Result   Stable CT brain with no acute intracranial abnormality. MRI BRAIN WO CONTRAST    (Results Pending)     LABS: All lab results were reviewed by myself, and all abnormals are listed below.   Labs Reviewed   CBC WITH AUTO DIFFERENTIAL - Abnormal; Notable for the following components:       Result Value    WBC 3.1 (*)     Hemoglobin 12.6 (*)     Hematocrit 38.0 (*)     RDW 16.2 (*)     Platelets 414 (*)     Seg Neutrophils 27 (*)     Lymphocytes 49 (*)     Monocytes 14 (*)     Eosinophils % 9 (*)     nRBC 1 (*)     Segs Absolute 0.83 (*)     All other components within normal limits   COMPREHENSIVE METABOLIC PANEL - Abnormal; Notable for the following components:    Alkaline Phosphatase 217 (*)      (*)      (*)     All other components within normal limits   CK - Abnormal; Notable for the following components: Total CK 4,582 (*)     All other components within normal limits   ACETAMINOPHEN LEVEL - Abnormal; Notable for the following components:    Acetaminophen Level <5 (*)     All other components within normal limits   MAGNESIUM   AMMONIA   URINALYSIS   URINE DRUG SCREEN       EMERGENCY DEPARTMENTCOURSE:         Vitals:    Vitals:    06/21/21 0856 06/21/21 0936 06/21/21 1044 06/21/21 1252   BP: 127/89 117/79 122/83 126/89   Pulse: 77 72 76 75   Resp: 14 14 16 18   Temp:    98 °F (36.7 °C)   TempSrc:    Oral   SpO2: 96% 96% 96% 100%   Weight:       Height:           The patient was given the following medications while in the emergency department:  Orders Placed This Encounter   Medications    0.9 % sodium chloride bolus    0.9 % sodium chloride infusion    0.9 % sodium chloride infusion     CONSULTS:  IP CONSULT TO FAMILY MEDICINE    FINAL IMPRESSION      1. Non-traumatic rhabdomyolysis    2. Elevated LFTs    3. Nonintractable episodic headache, unspecified headache type    4. Seizure [facility])          DISPOSITION/PLAN   DISPOSITION Admitted 06/21/2021 12:27:20 PM      PATIENT REFERRED TO:  No follow-up provider specified.   DISCHARGE MEDICATIONS:  New Prescriptions    No medications on file     Tito Kaba MD  Attending Emergency Physician                    Tito Kaba MD  06/21/21 1257

## 2021-06-22 LAB
ALBUMIN SERPL-MCNC: 3.7 G/DL (ref 3.5–5.2)
ALBUMIN/GLOBULIN RATIO: ABNORMAL (ref 1–2.5)
ALP BLD-CCNC: 182 U/L (ref 40–129)
ALT SERPL-CCNC: 130 U/L (ref 5–41)
ANION GAP SERPL CALCULATED.3IONS-SCNC: 9 MMOL/L (ref 9–17)
AST SERPL-CCNC: 280 U/L
BILIRUB SERPL-MCNC: 1.28 MG/DL (ref 0.3–1.2)
BUN BLDV-MCNC: 12 MG/DL (ref 6–20)
BUN/CREAT BLD: ABNORMAL (ref 9–20)
CALCIUM SERPL-MCNC: 7.8 MG/DL (ref 8.6–10.4)
CHLORIDE BLD-SCNC: 102 MMOL/L (ref 98–107)
CO2: 26 MMOL/L (ref 20–31)
CREAT SERPL-MCNC: 0.66 MG/DL (ref 0.7–1.2)
GFR AFRICAN AMERICAN: >60 ML/MIN
GFR NON-AFRICAN AMERICAN: >60 ML/MIN
GFR SERPL CREATININE-BSD FRML MDRD: ABNORMAL ML/MIN/{1.73_M2}
GFR SERPL CREATININE-BSD FRML MDRD: ABNORMAL ML/MIN/{1.73_M2}
GLUCOSE BLD-MCNC: 107 MG/DL (ref 70–99)
MAGNESIUM: 1.4 MG/DL (ref 1.6–2.6)
POTASSIUM SERPL-SCNC: 3.8 MMOL/L (ref 3.7–5.3)
SODIUM BLD-SCNC: 137 MMOL/L (ref 135–144)
TOTAL CK: 2941 U/L (ref 39–308)
TOTAL CK: 2946 U/L (ref 39–308)
TOTAL PROTEIN: 6.2 G/DL (ref 6.4–8.3)
TSH SERPL DL<=0.05 MIU/L-ACNC: 1.76 MIU/L (ref 0.3–5)

## 2021-06-22 PROCEDURE — 82550 ASSAY OF CK (CPK): CPT

## 2021-06-22 PROCEDURE — 80053 COMPREHEN METABOLIC PANEL: CPT

## 2021-06-22 PROCEDURE — 1200000000 HC SEMI PRIVATE

## 2021-06-22 PROCEDURE — 84443 ASSAY THYROID STIM HORMONE: CPT

## 2021-06-22 PROCEDURE — 82607 VITAMIN B-12: CPT

## 2021-06-22 PROCEDURE — 6370000000 HC RX 637 (ALT 250 FOR IP): Performed by: PSYCHIATRY & NEUROLOGY

## 2021-06-22 PROCEDURE — 82746 ASSAY OF FOLIC ACID SERUM: CPT

## 2021-06-22 PROCEDURE — 2580000003 HC RX 258: Performed by: FAMILY MEDICINE

## 2021-06-22 PROCEDURE — 6370000000 HC RX 637 (ALT 250 FOR IP): Performed by: FAMILY MEDICINE

## 2021-06-22 PROCEDURE — 6360000002 HC RX W HCPCS: Performed by: FAMILY MEDICINE

## 2021-06-22 PROCEDURE — 36415 COLL VENOUS BLD VENIPUNCTURE: CPT

## 2021-06-22 PROCEDURE — 99222 1ST HOSP IP/OBS MODERATE 55: CPT | Performed by: FAMILY MEDICINE

## 2021-06-22 PROCEDURE — 83735 ASSAY OF MAGNESIUM: CPT

## 2021-06-22 RX ORDER — MAGNESIUM SULFATE 1 G/100ML
1000 INJECTION INTRAVENOUS PRN
Status: DISCONTINUED | OUTPATIENT
Start: 2021-06-22 | End: 2021-06-26 | Stop reason: HOSPADM

## 2021-06-22 RX ORDER — GAUZE BANDAGE 2" X 2"
100 BANDAGE TOPICAL DAILY
Status: DISCONTINUED | OUTPATIENT
Start: 2021-06-22 | End: 2021-06-26 | Stop reason: HOSPADM

## 2021-06-22 RX ORDER — DIAZEPAM 5 MG/1
2.5 TABLET ORAL 3 TIMES DAILY
Status: DISCONTINUED | OUTPATIENT
Start: 2021-06-22 | End: 2021-06-26 | Stop reason: HOSPADM

## 2021-06-22 RX ORDER — CYCLOBENZAPRINE HCL 10 MG
10 TABLET ORAL 3 TIMES DAILY
Status: DISCONTINUED | OUTPATIENT
Start: 2021-06-22 | End: 2021-06-26 | Stop reason: HOSPADM

## 2021-06-22 RX ORDER — M-VIT,TX,IRON,MINS/CALC/FOLIC 27MG-0.4MG
1 TABLET ORAL DAILY
Status: DISCONTINUED | OUTPATIENT
Start: 2021-06-22 | End: 2021-06-26 | Stop reason: HOSPADM

## 2021-06-22 RX ORDER — FOLIC ACID 1 MG/1
1 TABLET ORAL DAILY
Status: DISCONTINUED | OUTPATIENT
Start: 2021-06-22 | End: 2021-06-26 | Stop reason: HOSPADM

## 2021-06-22 RX ADMIN — METOPROLOL TARTRATE 12.5 MG: 25 TABLET, FILM COATED ORAL at 07:47

## 2021-06-22 RX ADMIN — CITALOPRAM HYDROBROMIDE 10 MG: 20 TABLET ORAL at 07:47

## 2021-06-22 RX ADMIN — ASPIRIN 325 MG: 325 TABLET, COATED ORAL at 17:47

## 2021-06-22 RX ADMIN — ENOXAPARIN SODIUM 40 MG: 40 INJECTION SUBCUTANEOUS at 07:47

## 2021-06-22 RX ADMIN — DIAZEPAM 2.5 MG: 5 TABLET ORAL at 20:06

## 2021-06-22 RX ADMIN — CYCLOBENZAPRINE 10 MG: 10 TABLET, FILM COATED ORAL at 17:47

## 2021-06-22 RX ADMIN — MAGNESIUM SULFATE HEPTAHYDRATE 1000 MG: 1 INJECTION, SOLUTION INTRAVENOUS at 15:18

## 2021-06-22 RX ADMIN — SODIUM CHLORIDE: 9 INJECTION, SOLUTION INTRAVENOUS at 04:35

## 2021-06-22 RX ADMIN — FOLIC ACID 1 MG: 1 TABLET ORAL at 17:47

## 2021-06-22 RX ADMIN — LORAZEPAM 0.5 MG: 0.5 TABLET ORAL at 04:40

## 2021-06-22 RX ADMIN — THIAMINE HCL TAB 100 MG 100 MG: 100 TAB at 17:47

## 2021-06-22 RX ADMIN — MAGNESIUM SULFATE HEPTAHYDRATE 1000 MG: 1 INJECTION, SOLUTION INTRAVENOUS at 13:54

## 2021-06-22 RX ADMIN — MULTIPLE VITAMINS W/ MINERALS TAB 1 TABLET: TAB at 17:47

## 2021-06-22 RX ADMIN — METOPROLOL TARTRATE 12.5 MG: 25 TABLET, FILM COATED ORAL at 20:06

## 2021-06-22 RX ADMIN — CETIRIZINE HYDROCHLORIDE 10 MG: 10 TABLET, FILM COATED ORAL at 07:47

## 2021-06-22 NOTE — H&P
History and Physical    Patient:  Silvia Nathan  MRN: [id number]    CHIEF COMPLAINT:  Pain and cramping    History Obtained From:  patient, spouse  PCP: Rachel Lopez MD    HISTORY OF PRESENT ILLNESS:   The patient is a 47 y.o. male who presents with pain all over and cramping in multiple muscles, just if he would move. Thought he was just overdoing it with his running. Family made him come in. Also had a seizure on Saturday- has not had one for about 1 year. Had EEG last year that was normal.    Also has h/o alcohol abuse- taking ativan as needed to help with that, but states he has not been drinking as much as he used to. Past Medical History:        Diagnosis Date    Asthma     Essential hypertension 4/2/2018    High cholesterol 6/23/2018    Seasonal allergies        Past Surgical History:        Procedure Laterality Date    TRANSESOPHAGEAL ECHOCARDIOGRAM  9/13/14       Medications Prior to Admission:    Prior to Admission medications    Medication Sig Start Date End Date Taking? Authorizing Provider   Potassium 99 MG TABS Take 99 mg by mouth as needed    Yes Historical Provider, MD   Calcium-Magnesium 70-83 MG CAPS Take 1 capsule by mouth as needed    Yes Historical Provider, MD   cetirizine (ZYRTEC) 10 MG tablet Take 10 mg by mouth daily   Yes Historical Provider, MD   metoprolol tartrate (LOPRESSOR) 25 MG tablet Take 0.5 tablets by mouth 2 times daily 6/3/21  Yes AAMIR Mejia CNP   citalopram (CELEXA) 10 MG tablet Take 1 tablet by mouth daily 6/3/21  Yes AAMIR Mejia CNP   albuterol sulfate HFA (PROAIR HFA) 108 (90 Base) MCG/ACT inhaler Inhale 2 puffs into the lungs every 6 hours as needed for Wheezing or Shortness of Breath 4/24/20  Yes Rachel Lopez MD       Allergies:  Patient has no known allergies. Social History:   TOBACCO:   reports that he has never smoked. He has never used smokeless tobacco.  ETOH:   reports current alcohol use.   OCCUPATION:      Family History:       Problem Relation Age of Onset    Stroke Maternal Grandmother         cousin    High Blood Pressure Maternal Grandmother     Cancer Other         G. Mother , Cervix    Alcohol Abuse Father         G. Parents, Genna Mcclure. REVIEW OF SYSTEMS:  Negative except for muscle cramping and pain, seizure x1, decreased appetite, but no nausea or vomiting. Physical Exam:    Vitals: /79   Pulse 76   Temp 98.2 °F (36.8 °C) (Oral)   Resp 16   Ht 6' 3\" (1.905 m)   Wt 189 lb 9.5 oz (86 kg)   SpO2 98%   BMI 23.70 kg/m²   General appearance: alert, appears stated age and cooperative  Skin: Skin color, texture, turgor normal. No rashes or lesions  HEENT: Head: Normocephalic, no lesions, without obvious abnormality. Neck: supple, symmetrical, trachea midline  Lungs: clear to auscultation bilaterally  Heart: regular rate and rhythm, S1, S2 normal, no murmur, click, rub or gallop  Abdomen: normal findings: soft, muscle tenderness  Extremities: extremities normal, atraumatic, no cyanosis or edema  Neurologic: Mental status: Alert, oriented, thought content appropriate    CBC:   Recent Labs     06/21/21  0928   WBC 3.1*   HGB 12.6*   *     BMP:    Recent Labs     06/21/21  0928 06/22/21  0540    137   K 4.0 3.8    102   CO2 25 26   BUN 17 12   CREATININE 0.71 0.66*   GLUCOSE 95 107*     Hepatic:   Recent Labs     06/21/21  0928 06/22/21  0540   * 280*   * 130*   BILITOT 1.09 1.28*   ALKPHOS 217* 182*     Troponin: No results for input(s): TROPONINI in the last 72 hours. BNP: No results for input(s): BNP in the last 72 hours. Lipids: No results for input(s): CHOL, HDL in the last 72 hours. Invalid input(s): LDLCALCU  ABGs: No results found for: PHART, PO2ART, [id number]  INR: No results for input(s): INR in the last 72 hours. -----------------------------------------------------------------  PA/lat CXR: see results  EKG: -    Assessment and Plan   1.  rhabdo-?seizure as cause, but pt was having issues prior to that- ppossible new med of celexa? Consult neuro for opinion. 2. Anxiety- taking celexa- ?cause of seizure or rhabdo?  >1 % per lexicomp. Consider changing to buspar or other option. Continue atarax as needed.      Patient Active Problem List   Diagnosis Code    Seasonal allergies J30.2    Mild intermittent asthma without complication Y96.83    Essential hypertension I10    History of stroke Z86.73    High cholesterol E78.00    Medically noncompliant Z91.19    Rhabdomyolysis M62.82       Electronically signed by Nidia Francis MD on 6/22/2021 at 10:53 AM

## 2021-06-22 NOTE — PLAN OF CARE
Problem: Pain:  Goal: Pain level will decrease  Description: Pain level will decrease  6/22/2021 1248 by Mahamed Vargas RN  Outcome: Ongoing, Patient currently denies pain at this time. Problem: Fluid Volume - Imbalance:  Goal: Absence of imbalanced fluid volume signs and symptoms  Description: Absence of imbalanced fluid volume signs and symptoms  6/22/2021 1248 by Mahamed Vargas RN  Outcome: Ongoing, Patient IV fluids running 200ml/hr. Urinal for output counts      Problem: Falls - Risk of:  Goal: Will remain free from falls  Description: Will remain free from falls  6/22/2021 1248 by Mahamed Vargas RN  Outcome: Ongoing, Patient is alert and oriented, able to make needs known. Patient is aware of limiations.  Patient is up per self to bathroom, uses urinal for Output calculations

## 2021-06-22 NOTE — PLAN OF CARE
Problem: Pain:  Goal: Pain level will decrease  Description: Pain level will decrease  6/22/2021 0442 by Hannah Pedersen RN  Outcome: Ongoing  6/21/2021 1925 by Dustin Cruz RN  Outcome: Ongoing  Goal: Control of acute pain  Description: Control of acute pain  6/22/2021 0442 by Hannah Pedersen RN  Outcome: Ongoing  6/21/2021 1925 by Dustin Cruz RN  Outcome: Ongoing  Goal: Control of chronic pain  Description: Control of chronic pain  6/22/2021 0442 by Hannah Pedersen RN  Outcome: Ongoing  6/21/2021 1925 by Dustin Cruz RN  Outcome: Ongoing     Problem: Fluid Volume - Imbalance:  Goal: Absence of imbalanced fluid volume signs and symptoms  Description: Absence of imbalanced fluid volume signs and symptoms  6/22/2021 0442 by Hannah Pedersen RN  Outcome: Ongoing  6/21/2021 1925 by Dustin Cruz RN  Outcome: Ongoing     Problem: Falls - Risk of:  Goal: Will remain free from falls  Description: Will remain free from falls  6/22/2021 0442 by Hannah Pedersen RN  Outcome: Ongoing  6/21/2021 1925 by Dustin Cruz RN  Outcome: Ongoing  Goal: Absence of physical injury  Description: Absence of physical injury  6/22/2021 0442 by Hannah Pedersen RN  Outcome: Ongoing  6/21/2021 1925 by Dustin Cruz RN  Outcome: Ongoing

## 2021-06-22 NOTE — CONSULTS
.  Department of Neurology      Requesting Physician:  Charmayne Kirsch, MD/Sravanthi Hays MD     CHIEF COMPLAINT:     This gentleman came to the hospital 2 days after experiencing a generalized seizure. Thereafter, had severe pain of his muscles and cramping. This was painful. He is athletic and is known to have muscle cramps post exercise but this was unbearable. It is debatable whether he was drinking at a party 2 days ago he denies that his wife states that people reported this to her. Alcohol level was not drawn at the time of admission however toxicology screen was negative. He smokes. A year ago, his seizure was reported when he was found in a motel with blood around him from injuries. According to the patient he was taken to [facility] seen by our service and prescribed anticonvulsants. He did not take the medicine. Years before he also had a witnessed seizure. 2014, admitted to [facility] for reasons unclear, had a work-up for stroke, found a right cerebellar infarction. Hypercoagulable work-up was partly reviewed from the chart, unrevealing. His daughter who suffers from cerebral palsy has seizures. Grandmother had a stroke. Initially, prior to this event he lost vision in both eyes thereafter lost consciousness. Incontinence and injury were not reported. Following admission 2 days later markedly elevated CPK in the 2000's. Very adverse to answering questions seems irritable slightly dismissive tends to dismiss concerns especially of his wife. Wife reports significant marital problems secondary to alcoholism. 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 Aggressive towards providing a good description. No history of cranial or spinal injury. ST need muscular well-built middle-aged gentleman awake alert fluent. Lacking right nasolabial fold. Pupils 4 mm reactive. Extraocular movements are full. Symmetric face.   Tongue palate uvula midline. Motor examination reveals normal bulk tone and strength. Cerebellar testing is normal.  Reflexes 1+. Cardiac and carotid auscultation unremarkable. Standing minimally positive Romberg's test eyes closed. Assessment:  Multiple generalized seizures question of alcohol induced or alcohol withdrawal.  Possibly may benefit from long-term anticonvulsant use since these events are almost on a yearly basis. At the present time Dilantin would be a better choice for alcohol-related seizure. May have mental status problems with Depakote and Keppra especially while drinking. 2.  Ataxia #3 right cerebellar infarction etiology unclear  Recommendations:  Should not drive a motor vehicle in the [address] MCDONALD unless allowed by physicians. Awareness of state regulations discussed reviewed and explained. Should not work around moving machinery fire, underwater, or heights greater than 5 feet. Should stay away from hot objects as well. 4.  Alcohol rehabilitation. We will draw a B12 and folate levels for ataxia, after which B12 folate and multiple vitamins were ordered. 5.  Contrasted MRI scan brain #6 EEG. Neurological follow-up with physicians at Dr. Stewart Paul recommendation, unfortunately I will be unable to follow-up with him. It was discussed with the patient and his wife. All above discussed reviewed and explained. Nursing staff will provide first-aid instructions. Extensive time was spent discussing seizure first-aid instructions predisposing conditions and safety measures. Thank    HISTORY OF PRESENT ILLNESS:                 The patient is a 47 y.o. male who presents with     Allergies:  Patient has no known allergies.     Current Medications:   Scheduled Meds:   sodium chloride flush  5-40 mL Intravenous 2 times per day    enoxaparin  40 mg Subcutaneous Daily    cetirizine  10 mg Oral Daily    [Held by provider] citalopram  10 mg Oral Daily    metoprolol tartrate  12.5 mg Oral BID  Seasonal allergies           Past Problem List       Patient Active Problem List   Diagnosis Code    Seasonal allergies J30.2    Mild intermittent asthma without complication U16.62    Essential hypertension I10    History of stroke Z86.73    High cholesterol E78.00    Medically noncompliant Z91.19    Rhabdomyolysis M62.82      SURGICAL HISTORY        Past Surgical History         Past Surgical History:   Procedure Laterality Date    TRANSESOPHAGEAL ECHOCARDIOGRAM   9/13/14         CURRENT MEDICATIONS             Previous Medications     ALBUTEROL SULFATE HFA (PROAIR HFA) 108 (90 BASE) MCG/ACT INHALER    Inhale 2 puffs into the lungs every 6 hours as needed for Wheezing or Shortness of Breath     CETIRIZINE (ZYRTEC) 10 MG TABLET    Take 10 mg by mouth daily     CITALOPRAM (CELEXA) 10 MG TABLET    Take 1 tablet by mouth daily     METOPROLOL TARTRATE (LOPRESSOR) 25 MG TABLET    Take 0.5 tablets by mouth 2 times daily      ALLERGIES     has No Known Allergies. FAMILY HISTORY     He indicated that the status of his father is unknown. He indicated that the status of his maternal grandmother is unknown. He indicated that the status of his other is unknown. SOCIAL HISTORY        Social History            Tobacco Use    Smoking status: Never Smoker    Smokeless tobacco: Never Used   Substance Use Topics    Alcohol use: Yes       Comment: Pt states he last drank on 5/30/2021    Drug use:  No            PHYSICAL EXAM:       BP (!) 146/99   Pulse 64   Temp 98.2 °F (36.8 °C) (Oral)   Resp 16   Ht 6' 3\" (1.905 m)   Wt 189 lb 9.5 oz (86 kg)   SpO2 96%   BMI 23.70 kg/m²       LABS AND IMAGING:       Admission on 06/21/2021   Component Date Value Ref Range Status    WBC 06/21/2021 3.1* 3.5 - 11.0 k/uL Final    RBC 06/21/2021 4.73  4.5 - 5.9 m/uL Final    Hemoglobin 06/21/2021 12.6* 13.5 - 17.5 g/dL Final    Hematocrit 06/21/2021 38.0* 41 - 53 % Final    MCV 06/21/2021 80.4  80 - 100 fL Final   [facility] Comment:       (Positive cutoff 200 ng/mL)                  Benzodiazepine Screen, Urine 06/21/2021 NEGATIVE  NEGATIVE Final    Comment:       (Positive cutoff 200 ng/mL)                  Cocaine Metabolite, Urine 06/21/2021 NEGATIVE  NEGATIVE Final    Comment:       (Positive cutoff 300 ng/mL)                  Methadone Screen, Urine 06/21/2021 NEGATIVE  NEGATIVE Final    Comment:       (Positive cutoff 300 ng/mL)                  Opiates, Urine 06/21/2021 NEGATIVE  NEGATIVE Final    Comment:       (Positive cutoff 300 ng/mL)                  Phencyclidine, Urine 06/21/2021 NEGATIVE  NEGATIVE Final    Comment:       (Positive cutoff 25 ng/mL)                  Propoxyphene, Urine 06/21/2021 NOT REPORTED  NEGATIVE Final    Cannabinoid Scrn, Ur 06/21/2021 NEGATIVE  NEGATIVE Final    Comment:       (Positive cutoff 50 ng/mL)                  Oxycodone Screen, Ur 06/21/2021 NEGATIVE  NEGATIVE Final    Comment:       (Positive cutoff 100 ng/mL)                  Methamphetamine, Urine 06/21/2021 NOT REPORTED  NEGATIVE Final    Tricyclic Antidepressants, Urine 06/21/2021 NOT REPORTED  NEGATIVE Final    MDMA, Urine 06/21/2021 NOT REPORTED  NEGATIVE Final    Buprenorphine Urine 06/21/2021 NOT REPORTED  NEGATIVE Final    Test Information 06/21/2021 Assay provides medical screening only. The absence of expected drug(s) and/or metabolite(s) may indicate diluted or adulterated urine, limitations of testing or timing of collection. Final    Comment: Testing for legal purposes should be confirmed by another method. To request confirmation   of test result, please call the lab within 7 days of sample submission.       - 06/21/2021        Final    WBC, UA 06/21/2021 2 TO 5  /HPF Final    RBC, UA 06/21/2021 0 TO 2  /HPF Final    Casts UA 06/21/2021 NOT REPORTED  /LPF Final    Crystals, UA 06/21/2021 NOT REPORTED  None /HPF Final    Epithelial Cells UA 06/21/2021 2 TO 5  /HPF Final    Renal Epithelial, UA 06/21/2021 NOT REPORTED  0 /HPF Final    Bacteria, UA 06/21/2021 FEW* None Final    Mucus, UA 06/21/2021 1+* None Final    Trichomonas, UA 06/21/2021 NOT REPORTED  None Final    Amorphous, UA 06/21/2021 NOT REPORTED  None Final    Other Observations UA 06/21/2021 NOT REPORTED  NOT REQ. Final    Yeast, UA 06/21/2021 NOT REPORTED  None Final    Total CK 06/22/2021 2,946* 39 - 308 U/L Final    Glucose 06/22/2021 107* 70 - 99 mg/dL Final    BUN 06/22/2021 12  6 - 20 mg/dL Final    CREATININE 06/22/2021 0.66* 0.70 - 1.20 mg/dL Final    Bun/Cre Ratio 06/22/2021 NOT REPORTED  9 - 20 Final    Calcium 06/22/2021 7.8* 8.6 - 10.4 mg/dL Final    Sodium 06/22/2021 137  135 - 144 mmol/L Final    Potassium 06/22/2021 3.8  3.7 - 5.3 mmol/L Final    Chloride 06/22/2021 102  98 - 107 mmol/L Final    CO2 06/22/2021 26  20 - 31 mmol/L Final    Anion Gap 06/22/2021 9  9 - 17 mmol/L Final    Alkaline Phosphatase 06/22/2021 182* 40 - 129 U/L Final    ALT 06/22/2021 130* 5 - 41 U/L Final    AST 06/22/2021 280* <40 U/L Final    Total Bilirubin 06/22/2021 1.28* 0.3 - 1.2 mg/dL Final    Total Protein 06/22/2021 6.2* 6.4 - 8.3 g/dL Final    Albumin 06/22/2021 3.7  3.5 - 5.2 g/dL Final    Albumin/Globulin Ratio 06/22/2021 NOT REPORTED  1.0 - 2.5 Final    GFR Non- 06/22/2021 >60  >60 mL/min Final    GFR  06/22/2021 >60  >60 mL/min Final    GFR Comment 06/22/2021        Final    Comment: Average GFR for 30-36 years old:   80 mL/min/1.73sq m  Chronic Kidney Disease:   <60 mL/min/1.73sq m  Kidney failure:   <15 mL/min/1.73sq m              eGFR calculated using average adult body mass.  Additional eGFR calculator available at:        Repka.com.br            GFR Staging 06/22/2021 NOT REPORTED   Final    TSH 06/22/2021 1.76  0.30 - 5.00 mIU/L Final    Total CK 06/22/2021 2,941* 39 - 308 U/L Final    Magnesium 06/22/2021 1.4* 1.6 - 2.6 mg/dL Final Radiology Review:  CT HEAD WO CONTRAST    Result Date: 6/21/2021  EXAMINATION: CT OF THE HEAD WITHOUT CONTRAST  6/21/2021 10:16 am TECHNIQUE: CT of the head was performed without the administration of intravenous contrast. Dose modulation, iterative reconstruction, and/or weight based adjustment of the mA/kV was utilized to reduce the radiation dose to as low as reasonably achievable. COMPARISON: 02/18/2015 HISTORY: ORDERING SYSTEM PROVIDED HISTORY: headache, seizure Reason for Exam: ha seizure FINDINGS: BRAIN/VENTRICLES: No acute intracranial hemorrhage, mass effect or midline shift. No abnormal extra-axial fluid collection. The gray-white differentiation is maintained without evidence of an acute infarct. No evidence of hydrocephalus. Stable right vermian/cerebellar infarct. ORBITS: The visualized portion of the orbits demonstrate no acute abnormality. SINUSES: The visualized paranasal sinuses and mastoid air cells demonstrate no acute abnormality. SOFT TISSUES/SKULL:  No acute abnormality of the visualized skull or soft tissues. Stable CT brain with no acute intracranial abnormality. MRI BRAIN WO CONTRAST    Result Date: 6/21/2021  EXAMINATION: MRI OF THE BRAIN WITHOUT CONTRAST  6/21/2021 6:59 pm TECHNIQUE: Multiplanar multisequence MRI of the brain was performed without the administration of intravenous contrast. COMPARISON: 09/11/2014 HISTORY: ORDERING SYSTEM PROVIDED HISTORY: seizure, headaches TECHNOLOGIST PROVIDED HISTORY: seizure, headaches Reason for Exam: pt c/o headaches, dizziness and hit head when having a seizure, has meds for seizures but doesn't take them. Pt h/o prev hemorrhage. Initial evaluation FINDINGS: INTRACRANIAL STRUCTURES/VENTRICLES: There is no acute infarct. The ventricles and cisternal spaces are normal in size, shape, and configuration for the age of the patient. There is a previous area of injury or infarct in the right cerebellum.   No other areas of abnormal signal are identified in the visualized brain parenchyma. There is no midline shift or mass effect. There are no areas of restricted diffusion. ORBITS: The visualized portion of the orbits demonstrate no acute abnormality. SINUSES: There is mucoperiosteal thickening of the ethmoid air cells. The remainder of the sinuses are clear. The mastoid air cells are clear. BONES/SOFT TISSUES: The bone marrow signal intensity appears normal. The soft tissues demonstrate no acute abnormality. Previous area of injury or infarct in the right cerebellum. No acute brain parenchymal abnormality. IMPRESSION:      1. LESIONS WHICH PROBABLY REPRESENT EMBOLIC INFARCTIONS IN THE RIGHT    CEREBELLAR HEMISPHERE AND BOTH FRONTAL LOBES AND IN THE RIGHT PARIETAL    LOBE. ?       Report Electronically signed by Michelle Conley M.D. on 9/11/2014 11:28 AM   Transcribed by: [facility] on Sep 11 2014 11:30A          ASSESSMENT AND PLAN:       Patient Active Problem List   Diagnosis    Seasonal allergies    Mild intermittent asthma without complication    Essential hypertension    History of stroke    High cholesterol    Medically noncompliant    Rhabdomyolysis         Lennox Osborn M.D.   Neurology  6/22/2021  4:23 PM

## 2021-06-22 NOTE — CARE COORDINATION
CASE MANAGEMENT NOTE:    Admission Date:  6/21/2021 Gisselle Guthrie is a 47 y.o.  male    Admitted for : Rhabdomyolysis [M62.82]    Met with:  Patient    PCP:  Dr. Williams Velázquez:  Veena       Is patient alert and oriented at time of discussion:  Yes    Current Residence/ Living Arrangements:  independently at home             Current Services PTA:  No    Does patient go to outpatient dialysis: No  If yes, location and chair time:     Is patient agreeable to VNS: No    Freedom of choice provided:  No    List of [address] provided: No    VNS chosen:  No    DME:  none    Home Oxygen: No    Nebulizer: No    CPAP/BIPAP: No    Supplier: N/A    Potential Assistance Needed: No    SNF needed: No    Freedom of choice and list provided: No    Pharmacy:  [facility] in [address] [place] [address]       Does Patient want to use MEDS to BEDS? No    Is patient currently receiving oral anticoagulation therapy? No    Is the Patient an [facility] with Readmission Risk Score greater than 14%? No  If yes, pt needs a follow up appointment made within 7 days. Family Members/Caregivers that pt would like involved in their care:    Yes    If yes, list name here:  Eber Mcduffie    Transportation Provider:  Family             Discharge Plan:  6/22/21 Veena Pt is from home in a two story home she uses no dme and denies need for vns plan is to discharge to home with no needs will continue to follow for needs . //tv                 Electronically signed by:  Maranda Goodwin RN on 6/22/2021 at 12:05 PM

## 2021-06-22 NOTE — FLOWSHEET NOTE
Patient updated writer on medical condition and acknowledged being \"anxious\" about what it means. He is in considerable pain. He is part of a  community and has the contact info so did not need writer to contact hospital liaison. He was not interested in advance directives at this point. Writer provided listening presence and emotional support, and patient welcomed prayer. 06/22/21 1210   Encounter Summary   Services provided to: Patient   Referral/Consult From: Nurse   Support System Spouse;Parent; Family members; [employer] [facility]   Continue Visiting   ([phone])   Complexity of Encounter Moderate   Length of Encounter 30 minutes   Spiritual Assessment Completed Yes   Advance Care Planning Yes   Spiritual/Presybeterian   Type Spiritual support   Assessment Anxious   Intervention Active listening;Explored feelings, thoughts, concerns;Explored coping resources;Sustaining presence/ Ministry of presence; Discussed illness/injury and it's impact   Outcome Expressed gratitude;Engaged in conversation;Expressed feelings/needs/concerns;Receptive

## 2021-06-23 LAB
MAGNESIUM: 1.4 MG/DL (ref 1.6–2.6)
TOTAL CK: 1805 U/L (ref 39–308)

## 2021-06-23 PROCEDURE — 2580000003 HC RX 258: Performed by: FAMILY MEDICINE

## 2021-06-23 PROCEDURE — 83735 ASSAY OF MAGNESIUM: CPT

## 2021-06-23 PROCEDURE — 6360000002 HC RX W HCPCS: Performed by: FAMILY MEDICINE

## 2021-06-23 PROCEDURE — 36415 COLL VENOUS BLD VENIPUNCTURE: CPT

## 2021-06-23 PROCEDURE — 1200000000 HC SEMI PRIVATE

## 2021-06-23 PROCEDURE — 95819 EEG AWAKE AND ASLEEP: CPT | Performed by: PSYCHIATRY & NEUROLOGY

## 2021-06-23 PROCEDURE — 82550 ASSAY OF CK (CPK): CPT

## 2021-06-23 PROCEDURE — 6370000000 HC RX 637 (ALT 250 FOR IP): Performed by: FAMILY MEDICINE

## 2021-06-23 PROCEDURE — 95819 EEG AWAKE AND ASLEEP: CPT

## 2021-06-23 PROCEDURE — 6370000000 HC RX 637 (ALT 250 FOR IP): Performed by: PSYCHIATRY & NEUROLOGY

## 2021-06-23 PROCEDURE — 2580000003 HC RX 258: Performed by: EMERGENCY MEDICINE

## 2021-06-23 RX ADMIN — FOLIC ACID 1 MG: 1 TABLET ORAL at 09:38

## 2021-06-23 RX ADMIN — THIAMINE HCL TAB 100 MG 100 MG: 100 TAB at 09:38

## 2021-06-23 RX ADMIN — DIAZEPAM 2.5 MG: 5 TABLET ORAL at 14:05

## 2021-06-23 RX ADMIN — SODIUM CHLORIDE: 9 INJECTION, SOLUTION INTRAVENOUS at 04:07

## 2021-06-23 RX ADMIN — CYCLOBENZAPRINE 10 MG: 10 TABLET, FILM COATED ORAL at 09:38

## 2021-06-23 RX ADMIN — METOPROLOL TARTRATE 12.5 MG: 25 TABLET, FILM COATED ORAL at 21:30

## 2021-06-23 RX ADMIN — CYCLOBENZAPRINE 10 MG: 10 TABLET, FILM COATED ORAL at 14:05

## 2021-06-23 RX ADMIN — SODIUM CHLORIDE: 9 INJECTION, SOLUTION INTRAVENOUS at 21:30

## 2021-06-23 RX ADMIN — SODIUM CHLORIDE: 9 INJECTION, SOLUTION INTRAVENOUS at 11:16

## 2021-06-23 RX ADMIN — MAGNESIUM SULFATE HEPTAHYDRATE 1000 MG: 1 INJECTION, SOLUTION INTRAVENOUS at 11:13

## 2021-06-23 RX ADMIN — CYCLOBENZAPRINE 10 MG: 10 TABLET, FILM COATED ORAL at 21:30

## 2021-06-23 RX ADMIN — MAGNESIUM SULFATE HEPTAHYDRATE 1000 MG: 1 INJECTION, SOLUTION INTRAVENOUS at 14:05

## 2021-06-23 RX ADMIN — ASPIRIN 325 MG: 325 TABLET, COATED ORAL at 09:38

## 2021-06-23 RX ADMIN — DIAZEPAM 2.5 MG: 5 TABLET ORAL at 09:37

## 2021-06-23 RX ADMIN — ENOXAPARIN SODIUM 40 MG: 40 INJECTION SUBCUTANEOUS at 09:43

## 2021-06-23 RX ADMIN — DIAZEPAM 2.5 MG: 5 TABLET ORAL at 21:30

## 2021-06-23 RX ADMIN — METOPROLOL TARTRATE 12.5 MG: 25 TABLET, FILM COATED ORAL at 09:38

## 2021-06-23 RX ADMIN — CETIRIZINE HYDROCHLORIDE 10 MG: 10 TABLET, FILM COATED ORAL at 09:38

## 2021-06-23 RX ADMIN — MULTIPLE VITAMINS W/ MINERALS TAB 1 TABLET: TAB at 09:38

## 2021-06-23 NOTE — PLAN OF CARE
Problem: Pain:  Goal: Pain level will decrease  Description: Pain level will decrease  Outcome: Ongoing  Note: No complaints of pain this shift, only intense muscle cramping    Goal: Control of acute pain  Description: Control of acute pain  Outcome: Ongoing  Goal: Control of chronic pain  Description: Control of chronic pain  Outcome: Ongoing     Problem: Fluid Volume - Imbalance:  Goal: Absence of imbalanced fluid volume signs and symptoms  Description: Absence of imbalanced fluid volume signs and symptoms  Outcome: Ongoing     Problem: Falls - Risk of:  Goal: Will remain free from falls  Description: Will remain free from falls  Outcome: Ongoing  Note: No falls this shift. Call light within reach, side rails up x2, bed in lowest position. Patient safety maintained. Goal: Absence of physical injury  Description: Absence of physical injury  Outcome: Ongoing  Note: No injury this shift. Patient safety maintained.

## 2021-06-23 NOTE — PROCEDURES
EEG    Reason for EEG:      TECHNICAL DESCRIPTION  This is a 29 minute 21 channel digital EEG recording with the patient awake, drowsy and asleep. Electrodes were placed in accordance with the 10-20 International System of Electrode Placement. Single lead EKG monitoring was included    Waking background activity consists of well-formed and symmetrical low voltage   9-10 Hz activity seen posteriorly that attenuates with eye opening. Low voltage fast frequency beta activity is seen diffusely. During drowsiness, there is attenuation of background activity. Vertex sharp waves and sleep spindles were seen during sleep. Hyperventilation is not performed. Photic stimulation is unremarkable. There are no epileptiform discharges seen. IMPRESSION  The awake, drowsy and asleep EEG is normal. Hand twitching and leg movements were seen during sleep but there were no accompanying EEG changes. Diffuse beta activity is seen in patients taking benzodiazepines, barbiturates, anxiolytics or hypnotics. There is no definite electrophysiologic evidence of focal slowing and epileptiform activity. One-lead EKG showed PVC's.     Chuckie Kang MD

## 2021-06-23 NOTE — CARE COORDINATION
ONGOING DISCHARGE PLAN:    Patient is alert and oriented x4. Spoke with patient regarding discharge plan and patient confirms that plan is still to discharge to home with no needs   Patient had a EEG done today   MRI Brain ordered   Ns @ 150 ml   Total CK 1805    Will continue to follow for additional discharge needs.     Electronically signed by Evelio Benavides RN on 6/23/2021 at 12:16 PM

## 2021-06-24 ENCOUNTER — APPOINTMENT (OUTPATIENT)
Dept: MRI IMAGING | Age: 41
DRG: 053 | End: 2021-06-24
Payer: MEDICARE

## 2021-06-24 LAB
MAGNESIUM: 1.4 MG/DL (ref 1.6–2.6)
MAGNESIUM: 1.9 MG/DL (ref 1.6–2.6)
TOTAL CK: 1132 U/L (ref 39–308)

## 2021-06-24 PROCEDURE — 6360000004 HC RX CONTRAST MEDICATION: Performed by: PSYCHIATRY & NEUROLOGY

## 2021-06-24 PROCEDURE — 2580000003 HC RX 258: Performed by: PSYCHIATRY & NEUROLOGY

## 2021-06-24 PROCEDURE — 1200000000 HC SEMI PRIVATE

## 2021-06-24 PROCEDURE — A9579 GAD-BASE MR CONTRAST NOS,1ML: HCPCS | Performed by: PSYCHIATRY & NEUROLOGY

## 2021-06-24 PROCEDURE — 6370000000 HC RX 637 (ALT 250 FOR IP): Performed by: PSYCHIATRY & NEUROLOGY

## 2021-06-24 PROCEDURE — 99232 SBSQ HOSP IP/OBS MODERATE 35: CPT | Performed by: FAMILY MEDICINE

## 2021-06-24 PROCEDURE — 83735 ASSAY OF MAGNESIUM: CPT

## 2021-06-24 PROCEDURE — 70553 MRI BRAIN STEM W/O & W/DYE: CPT

## 2021-06-24 PROCEDURE — 82550 ASSAY OF CK (CPK): CPT

## 2021-06-24 PROCEDURE — 6370000000 HC RX 637 (ALT 250 FOR IP): Performed by: FAMILY MEDICINE

## 2021-06-24 PROCEDURE — 36415 COLL VENOUS BLD VENIPUNCTURE: CPT

## 2021-06-24 PROCEDURE — 6360000002 HC RX W HCPCS: Performed by: FAMILY MEDICINE

## 2021-06-24 RX ORDER — SODIUM CHLORIDE 0.9 % (FLUSH) 0.9 %
10 SYRINGE (ML) INJECTION PRN
Status: DISCONTINUED | OUTPATIENT
Start: 2021-06-24 | End: 2021-06-26 | Stop reason: HOSPADM

## 2021-06-24 RX ADMIN — DIAZEPAM 2.5 MG: 5 TABLET ORAL at 21:11

## 2021-06-24 RX ADMIN — METOPROLOL TARTRATE 12.5 MG: 25 TABLET, FILM COATED ORAL at 10:13

## 2021-06-24 RX ADMIN — MAGNESIUM SULFATE HEPTAHYDRATE 1000 MG: 1 INJECTION, SOLUTION INTRAVENOUS at 12:37

## 2021-06-24 RX ADMIN — THIAMINE HCL TAB 100 MG 100 MG: 100 TAB at 10:13

## 2021-06-24 RX ADMIN — GADOTERIDOL 18 ML: 279.3 INJECTION, SOLUTION INTRAVENOUS at 17:39

## 2021-06-24 RX ADMIN — SODIUM CHLORIDE, PRESERVATIVE FREE 10 ML: 5 INJECTION INTRAVENOUS at 17:40

## 2021-06-24 RX ADMIN — DIAZEPAM 2.5 MG: 5 TABLET ORAL at 10:22

## 2021-06-24 RX ADMIN — MULTIPLE VITAMINS W/ MINERALS TAB 1 TABLET: TAB at 10:13

## 2021-06-24 RX ADMIN — CYCLOBENZAPRINE 10 MG: 10 TABLET, FILM COATED ORAL at 21:11

## 2021-06-24 RX ADMIN — DIAZEPAM 2.5 MG: 5 TABLET ORAL at 14:12

## 2021-06-24 RX ADMIN — ASPIRIN 325 MG: 325 TABLET, COATED ORAL at 10:13

## 2021-06-24 RX ADMIN — MAGNESIUM SULFATE HEPTAHYDRATE 1000 MG: 1 INJECTION, SOLUTION INTRAVENOUS at 10:14

## 2021-06-24 RX ADMIN — CYCLOBENZAPRINE 10 MG: 10 TABLET, FILM COATED ORAL at 10:12

## 2021-06-24 RX ADMIN — CYCLOBENZAPRINE 10 MG: 10 TABLET, FILM COATED ORAL at 14:12

## 2021-06-24 RX ADMIN — CETIRIZINE HYDROCHLORIDE 10 MG: 10 TABLET, FILM COATED ORAL at 10:13

## 2021-06-24 RX ADMIN — METOPROLOL TARTRATE 12.5 MG: 25 TABLET, FILM COATED ORAL at 21:11

## 2021-06-24 RX ADMIN — FOLIC ACID 1 MG: 1 TABLET ORAL at 10:13

## 2021-06-24 NOTE — CARE COORDINATION
ONGOING DISCHARGE PLAN:    Patient is alert and oriented x4. Spoke with patient regarding discharge plan and patient confirms that plan is still to discharge to home with no needs  Patient will have his mag replaced today   Rhabdo improving   If patients pain improves pt can discharge   If patients pain does not improve will keep and discharge to home tomorrow        Will continue to follow for additional discharge needs.     Electronically signed by Clinton Jose RN on 6/24/2021 at 12:40 PM

## 2021-06-24 NOTE — PLAN OF CARE
Problem: Pain:  Description: Pain management should include both nonpharmacologic and pharmacologic interventions.   Goal: Pain level will decrease  Description: Pain level will decrease  6/24/2021 1650 by Elvia Oppenheim, RN  Outcome: Ongoing  6/24/2021 0538 by Anneliese Roblero RN  Outcome: Ongoing  Goal: Control of acute pain  Description: Control of acute pain  6/24/2021 1650 by Elvia Oppenheim, RN  Outcome: Ongoing  6/24/2021 0538 by Anneliese Roblero RN  Outcome: Ongoing  Goal: Control of chronic pain  Description: Control of chronic pain  6/24/2021 1650 by Elvia Oppenheim, RN  Outcome: Ongoing  6/24/2021 0538 by Anneliese Roblero RN  Outcome: Ongoing     Problem: Fluid Volume - Imbalance:  Goal: Absence of imbalanced fluid volume signs and symptoms  Description: Absence of imbalanced fluid volume signs and symptoms  6/24/2021 1650 by Elvia Oppenheim, RN  Outcome: Ongoing  6/24/2021 0538 by Anneliese Roblero RN  Outcome: Ongoing     Problem: Falls - Risk of:  Goal: Will remain free from falls  Description: Will remain free from falls  6/24/2021 1650 by Elvia Oppenheim, RN  Outcome: Ongoing  6/24/2021 0538 by Anneliese Roblero RN  Outcome: Ongoing  Goal: Absence of physical injury  Description: Absence of physical injury  6/24/2021 1650 by Elvia Oppenheim, RN  Outcome: Ongoing  6/24/2021 0538 by Anneliese Roblero RN  Outcome: Ongoing

## 2021-06-25 LAB
ANION GAP SERPL CALCULATED.3IONS-SCNC: 10 MMOL/L (ref 9–17)
BUN BLDV-MCNC: 10 MG/DL (ref 6–20)
BUN/CREAT BLD: ABNORMAL (ref 9–20)
CALCIUM SERPL-MCNC: 8.2 MG/DL (ref 8.6–10.4)
CHLORIDE BLD-SCNC: 103 MMOL/L (ref 98–107)
CO2: 26 MMOL/L (ref 20–31)
CREAT SERPL-MCNC: 0.68 MG/DL (ref 0.7–1.2)
FOLATE: 7.3 NG/ML
GFR AFRICAN AMERICAN: >60 ML/MIN
GFR NON-AFRICAN AMERICAN: >60 ML/MIN
GFR SERPL CREATININE-BSD FRML MDRD: ABNORMAL ML/MIN/{1.73_M2}
GFR SERPL CREATININE-BSD FRML MDRD: ABNORMAL ML/MIN/{1.73_M2}
GLUCOSE BLD-MCNC: 108 MG/DL (ref 70–99)
MAGNESIUM: 1.3 MG/DL (ref 1.6–2.6)
POTASSIUM SERPL-SCNC: 4.2 MMOL/L (ref 3.7–5.3)
SODIUM BLD-SCNC: 139 MMOL/L (ref 135–144)
TOTAL CK: 677 U/L (ref 39–308)
VITAMIN B-12: 522 PG/ML (ref 232–1245)

## 2021-06-25 PROCEDURE — 80048 BASIC METABOLIC PNL TOTAL CA: CPT

## 2021-06-25 PROCEDURE — 36415 COLL VENOUS BLD VENIPUNCTURE: CPT

## 2021-06-25 PROCEDURE — 1200000000 HC SEMI PRIVATE

## 2021-06-25 PROCEDURE — 6360000002 HC RX W HCPCS: Performed by: FAMILY MEDICINE

## 2021-06-25 PROCEDURE — 99232 SBSQ HOSP IP/OBS MODERATE 35: CPT | Performed by: FAMILY MEDICINE

## 2021-06-25 PROCEDURE — 6370000000 HC RX 637 (ALT 250 FOR IP): Performed by: PSYCHIATRY & NEUROLOGY

## 2021-06-25 PROCEDURE — 6370000000 HC RX 637 (ALT 250 FOR IP): Performed by: FAMILY MEDICINE

## 2021-06-25 PROCEDURE — 2580000003 HC RX 258: Performed by: EMERGENCY MEDICINE

## 2021-06-25 PROCEDURE — 83735 ASSAY OF MAGNESIUM: CPT

## 2021-06-25 PROCEDURE — 82550 ASSAY OF CK (CPK): CPT

## 2021-06-25 RX ADMIN — CYCLOBENZAPRINE 10 MG: 10 TABLET, FILM COATED ORAL at 22:13

## 2021-06-25 RX ADMIN — MAGNESIUM SULFATE HEPTAHYDRATE 1000 MG: 1 INJECTION, SOLUTION INTRAVENOUS at 11:36

## 2021-06-25 RX ADMIN — DIAZEPAM 2.5 MG: 5 TABLET ORAL at 08:09

## 2021-06-25 RX ADMIN — MAGNESIUM SULFATE HEPTAHYDRATE 1000 MG: 1 INJECTION, SOLUTION INTRAVENOUS at 14:13

## 2021-06-25 RX ADMIN — MAGNESIUM SULFATE HEPTAHYDRATE 1000 MG: 1 INJECTION, SOLUTION INTRAVENOUS at 12:53

## 2021-06-25 RX ADMIN — SODIUM CHLORIDE: 9 INJECTION, SOLUTION INTRAVENOUS at 22:09

## 2021-06-25 RX ADMIN — METOPROLOL TARTRATE 12.5 MG: 25 TABLET, FILM COATED ORAL at 22:13

## 2021-06-25 RX ADMIN — ASPIRIN 325 MG: 325 TABLET, COATED ORAL at 08:10

## 2021-06-25 RX ADMIN — CYCLOBENZAPRINE 10 MG: 10 TABLET, FILM COATED ORAL at 08:10

## 2021-06-25 RX ADMIN — METOPROLOL TARTRATE 12.5 MG: 25 TABLET, FILM COATED ORAL at 08:10

## 2021-06-25 RX ADMIN — LORAZEPAM 0.5 MG: 0.5 TABLET ORAL at 17:55

## 2021-06-25 RX ADMIN — MAGNESIUM SULFATE HEPTAHYDRATE 1000 MG: 1 INJECTION, SOLUTION INTRAVENOUS at 16:32

## 2021-06-25 RX ADMIN — DIAZEPAM 2.5 MG: 5 TABLET ORAL at 12:50

## 2021-06-25 RX ADMIN — CETIRIZINE HYDROCHLORIDE 10 MG: 10 TABLET, FILM COATED ORAL at 08:10

## 2021-06-25 RX ADMIN — FOLIC ACID 1 MG: 1 TABLET ORAL at 08:10

## 2021-06-25 RX ADMIN — THIAMINE HCL TAB 100 MG 100 MG: 100 TAB at 08:10

## 2021-06-25 RX ADMIN — Medication 400 MG: at 22:13

## 2021-06-25 RX ADMIN — DIAZEPAM 2.5 MG: 5 TABLET ORAL at 22:12

## 2021-06-25 RX ADMIN — MULTIPLE VITAMINS W/ MINERALS TAB 1 TABLET: TAB at 08:10

## 2021-06-25 RX ADMIN — SODIUM CHLORIDE: 9 INJECTION, SOLUTION INTRAVENOUS at 16:31

## 2021-06-25 RX ADMIN — CYCLOBENZAPRINE 10 MG: 10 TABLET, FILM COATED ORAL at 12:50

## 2021-06-25 ASSESSMENT — PAIN SCALES - GENERAL: PAINLEVEL_OUTOF10: 6

## 2021-06-25 ASSESSMENT — PAIN DESCRIPTION - FREQUENCY: FREQUENCY: CONTINUOUS

## 2021-06-25 ASSESSMENT — PAIN DESCRIPTION - DESCRIPTORS: DESCRIPTORS: CRAMPING;ACHING

## 2021-06-25 ASSESSMENT — PAIN DESCRIPTION - LOCATION: LOCATION: LEG

## 2021-06-25 NOTE — PLAN OF CARE
Problem: Pain:  Goal: Pain level will decrease  Description: Pain level will decrease  6/25/2021 1816 by Andre Simon RN  Outcome: Ongoing  6/25/2021 0503 by Blanca Rizzo RN  Outcome: Ongoing  Goal: Control of acute pain  Description: Control of acute pain  6/25/2021 1816 by Andre Simon RN  Outcome: Ongoing  6/25/2021 0503 by Blanca Rizzo RN  Outcome: Ongoing  Goal: Control of chronic pain  Description: Control of chronic pain  6/25/2021 1816 by Andre Simon RN  Outcome: Ongoing  6/25/2021 0503 by Blanca Rizzo RN  Outcome: Ongoing     Problem: Fluid Volume - Imbalance:  Goal: Absence of imbalanced fluid volume signs and symptoms  Description: Absence of imbalanced fluid volume signs and symptoms  6/25/2021 1816 by Andre Simon RN  Outcome: Ongoing  6/25/2021 0503 by Blanca Rizzo RN  Outcome: Ongoing     Problem: Falls - Risk of:  Goal: Will remain free from falls  Description: Will remain free from falls  6/25/2021 1816 by Andre Simon RN  Outcome: Ongoing  6/25/2021 0503 by Blanca Rizzo RN  Outcome: Ongoing  Goal: Absence of physical injury  Description: Absence of physical injury  6/25/2021 1816 by Andre Simon RN  Outcome: Ongoing  6/25/2021 0503 by Blanca Rizzo RN  Outcome: Ongoing

## 2021-06-25 NOTE — PLAN OF CARE
Problem: Pain:  Goal: Pain level will decrease  Description: Pain level will decrease  6/25/2021 0503 by Petty Sullivan RN  Outcome: Ongoing  6/24/2021 1650 by Alee Peña RN  Outcome: Ongoing  Goal: Control of acute pain  Description: Control of acute pain  6/25/2021 0503 by Petty Sullivan RN  Outcome: Ongoing  6/24/2021 1650 by Alee Peña RN  Outcome: Ongoing  Goal: Control of chronic pain  Description: Control of chronic pain  6/25/2021 0503 by Petty Sullivan RN  Outcome: Ongoing  6/24/2021 1650 by Alee Peña RN  Outcome: Ongoing     Problem: Fluid Volume - Imbalance:  Goal: Absence of imbalanced fluid volume signs and symptoms  Description: Absence of imbalanced fluid volume signs and symptoms  6/25/2021 0503 by Petty Sullivan RN  Outcome: Ongoing  6/24/2021 1650 by Alee Peña RN  Outcome: Ongoing     Problem: Falls - Risk of:  Goal: Will remain free from falls  Description: Will remain free from falls  6/25/2021 0503 by Petty Sullivan RN  Outcome: Ongoing  6/24/2021 1650 by Alee Peña RN  Outcome: Ongoing  Goal: Absence of physical injury  Description: Absence of physical injury  6/25/2021 0503 by Petty Sullivan RN  Outcome: Ongoing  6/24/2021 1650 by Alee Peña RN  Outcome: Ongoing

## 2021-06-25 NOTE — CARE COORDINATION
ONGOING DISCHARGE PLAN:    Patient is alert and oriented x4. Spoke with patient regarding discharge plan and patient confirms that plan is still to go home with no needs. Total CK is 677, Mag is 1.3    Probable discharge home tomorrow    Will continue to follow for additional discharge needs.     Electronically signed by Zahida Shultz RN on 6/25/2021 at 2:00 PM

## 2021-06-26 VITALS
WEIGHT: 187.61 LBS | HEART RATE: 87 BPM | RESPIRATION RATE: 16 BRPM | OXYGEN SATURATION: 95 % | TEMPERATURE: 97.7 F | HEIGHT: 75 IN | DIASTOLIC BLOOD PRESSURE: 95 MMHG | SYSTOLIC BLOOD PRESSURE: 146 MMHG | BODY MASS INDEX: 23.33 KG/M2

## 2021-06-26 LAB
ABSOLUTE EOS #: 0.17 K/UL (ref 0–0.4)
ABSOLUTE IMMATURE GRANULOCYTE: ABNORMAL K/UL (ref 0–0.3)
ABSOLUTE LYMPH #: 1.02 K/UL (ref 1–4.8)
ABSOLUTE MONO #: 0.41 K/UL (ref 0.1–1.3)
ALBUMIN SERPL-MCNC: 4.2 G/DL (ref 3.5–5.2)
ALBUMIN/GLOBULIN RATIO: ABNORMAL (ref 1–2.5)
ALP BLD-CCNC: 232 U/L (ref 40–129)
ALT SERPL-CCNC: 129 U/L (ref 5–41)
ANION GAP SERPL CALCULATED.3IONS-SCNC: 15 MMOL/L (ref 9–17)
AST SERPL-CCNC: 201 U/L
BASOPHILS # BLD: 1 % (ref 0–2)
BASOPHILS ABSOLUTE: 0.03 K/UL (ref 0–0.2)
BILIRUB SERPL-MCNC: 0.68 MG/DL (ref 0.3–1.2)
BUN BLDV-MCNC: 12 MG/DL (ref 6–20)
BUN/CREAT BLD: ABNORMAL (ref 9–20)
CALCIUM SERPL-MCNC: 9.1 MG/DL (ref 8.6–10.4)
CHLORIDE BLD-SCNC: 99 MMOL/L (ref 98–107)
CO2: 23 MMOL/L (ref 20–31)
CREAT SERPL-MCNC: 0.67 MG/DL (ref 0.7–1.2)
DIFFERENTIAL TYPE: ABNORMAL
EOSINOPHILS RELATIVE PERCENT: 5 % (ref 0–4)
GFR AFRICAN AMERICAN: >60 ML/MIN
GFR NON-AFRICAN AMERICAN: >60 ML/MIN
GFR SERPL CREATININE-BSD FRML MDRD: ABNORMAL ML/MIN/{1.73_M2}
GFR SERPL CREATININE-BSD FRML MDRD: ABNORMAL ML/MIN/{1.73_M2}
GLUCOSE BLD-MCNC: 97 MG/DL (ref 70–99)
HCT VFR BLD CALC: 39.1 % (ref 41–53)
HEMOGLOBIN: 13 G/DL (ref 13.5–17.5)
IMMATURE GRANULOCYTES: ABNORMAL %
LYMPHOCYTES # BLD: 30 % (ref 24–44)
MAGNESIUM: 1.6 MG/DL (ref 1.6–2.6)
MCH RBC QN AUTO: 27.3 PG (ref 26–34)
MCHC RBC AUTO-ENTMCNC: 33.3 G/DL (ref 31–37)
MCV RBC AUTO: 82.1 FL (ref 80–100)
MONOCYTES # BLD: 12 % (ref 1–7)
MORPHOLOGY: ABNORMAL
NRBC AUTOMATED: ABNORMAL PER 100 WBC
PDW BLD-RTO: 17.2 % (ref 11.5–14.9)
PLATELET # BLD: 177 K/UL (ref 150–450)
PLATELET ESTIMATE: ABNORMAL
PMV BLD AUTO: 7.8 FL (ref 6–12)
POTASSIUM SERPL-SCNC: 4.1 MMOL/L (ref 3.7–5.3)
RBC # BLD: 4.76 M/UL (ref 4.5–5.9)
RBC # BLD: ABNORMAL 10*6/UL
SEG NEUTROPHILS: 52 % (ref 36–66)
SEGMENTED NEUTROPHILS ABSOLUTE COUNT: 1.77 K/UL (ref 1.3–9.1)
SODIUM BLD-SCNC: 137 MMOL/L (ref 135–144)
TOTAL PROTEIN: 7.2 G/DL (ref 6.4–8.3)
WBC # BLD: 3.4 K/UL (ref 3.5–11)
WBC # BLD: ABNORMAL 10*3/UL

## 2021-06-26 PROCEDURE — 6370000000 HC RX 637 (ALT 250 FOR IP): Performed by: PSYCHIATRY & NEUROLOGY

## 2021-06-26 PROCEDURE — 2580000003 HC RX 258: Performed by: FAMILY MEDICINE

## 2021-06-26 PROCEDURE — 6370000000 HC RX 637 (ALT 250 FOR IP): Performed by: FAMILY MEDICINE

## 2021-06-26 PROCEDURE — 85025 COMPLETE CBC W/AUTO DIFF WBC: CPT

## 2021-06-26 PROCEDURE — 83735 ASSAY OF MAGNESIUM: CPT

## 2021-06-26 PROCEDURE — 36415 COLL VENOUS BLD VENIPUNCTURE: CPT

## 2021-06-26 PROCEDURE — 80053 COMPREHEN METABOLIC PANEL: CPT

## 2021-06-26 PROCEDURE — 2580000003 HC RX 258: Performed by: EMERGENCY MEDICINE

## 2021-06-26 PROCEDURE — 99239 HOSP IP/OBS DSCHRG MGMT >30: CPT | Performed by: FAMILY MEDICINE

## 2021-06-26 RX ORDER — FOLIC ACID 1 MG/1
1 TABLET ORAL DAILY
Qty: 30 TABLET | Refills: 3 | Status: SHIPPED | OUTPATIENT
Start: 2021-06-26 | End: 2021-07-08 | Stop reason: SDUPTHER

## 2021-06-26 RX ORDER — HYDROXYZINE HYDROCHLORIDE 25 MG/1
25 TABLET, FILM COATED ORAL EVERY 4 HOURS PRN
Qty: 30 TABLET | Refills: 3 | Status: SHIPPED | OUTPATIENT
Start: 2021-06-26 | End: 2021-07-06

## 2021-06-26 RX ADMIN — Medication 10 ML: at 11:18

## 2021-06-26 RX ADMIN — ASPIRIN 325 MG: 325 TABLET, COATED ORAL at 11:09

## 2021-06-26 RX ADMIN — SODIUM CHLORIDE: 9 INJECTION, SOLUTION INTRAVENOUS at 07:41

## 2021-06-26 RX ADMIN — SODIUM CHLORIDE: 9 INJECTION, SOLUTION INTRAVENOUS at 02:59

## 2021-06-26 RX ADMIN — FOLIC ACID 1 MG: 1 TABLET ORAL at 11:09

## 2021-06-26 RX ADMIN — Medication 400 MG: at 11:09

## 2021-06-26 RX ADMIN — CYCLOBENZAPRINE 10 MG: 10 TABLET, FILM COATED ORAL at 11:09

## 2021-06-26 RX ADMIN — MULTIPLE VITAMINS W/ MINERALS TAB 1 TABLET: TAB at 11:06

## 2021-06-26 RX ADMIN — METOPROLOL TARTRATE 12.5 MG: 25 TABLET, FILM COATED ORAL at 11:06

## 2021-06-26 RX ADMIN — THIAMINE HCL TAB 100 MG 100 MG: 100 TAB at 11:05

## 2021-06-26 RX ADMIN — DIAZEPAM 2.5 MG: 5 TABLET ORAL at 11:09

## 2021-06-26 RX ADMIN — CETIRIZINE HYDROCHLORIDE 10 MG: 10 TABLET, FILM COATED ORAL at 11:18

## 2021-06-26 NOTE — DISCHARGE SUMMARY
Discharge Summary    Loren Parnell  :  1980  MRN:  [id number]    Admit date:  2021  Discharge date:      Admitting Physician:  Cecile Stapleton MD    PCP: Cecile Stapleton MD    Discharge Diagnoses:    Patient Active Problem List   Diagnosis    Seasonal allergies    Mild intermittent asthma without complication    Essential hypertension    History of stroke    High cholesterol    Medically noncompliant    Rhabdomyolysis       Discharged Condition:  stable    Hospital Course:   Patient admitted for     1. Seizure- causing elevation in CK and rhabdo  2. rhabdo- levels are better and pain is better  3. hypomag- replaced with po and IV-still low- will send home on po bid  4. Alcohol abuse- with folic acid deficiency- home folic acid  5. Anxiety- will need somethin po long term, but will wait until stable otherwise  6.  HTN- probably due to anxiety- will see how that does once mag is better and anxiety is treated (lopressor started- should helpith anxiety as well)    Discharge Medications:       Guero Nelson 83 Medication Instructions ASX:[id number]    Printed on:21 1040   Medication Information                      albuterol sulfate HFA (PROAIR HFA) 108 (90 Base) MCG/ACT inhaler  Inhale 2 puffs into the lungs every 6 hours as needed for Wheezing or Shortness of Breath             cetirizine (ZYRTEC) 10 MG tablet  Take 10 mg by mouth daily             folic acid (FOLVITE) 1 MG tablet  Take 1 tablet by mouth daily             hydrOXYzine (ATARAX) 25 MG tablet  Take 1 tablet by mouth every 4 hours as needed for Anxiety             magnesium oxide (MAG-OX) 400 (241.3 Mg) MG TABS tablet  Take 1 tablet by mouth 2 times daily             metoprolol tartrate (LOPRESSOR) 25 MG tablet  Take 0.5 tablets by mouth 2 times daily             Potassium 99 MG TABS  Take 99 mg by mouth as needed                  Significant Diagnostic Studies:    CBC with Differential:    Lab Results   Component Value Date    WBC 3.1 06/21/2021    RBC 4.73 06/21/2021    HGB 12.6 06/21/2021    HCT 38.0 06/21/2021     06/21/2021    MCV 80.4 06/21/2021    MCH 26.6 06/21/2021    MCHC 33.1 06/21/2021    RDW 16.2 06/21/2021    NRBC 1 06/21/2021    LYMPHOPCT 49 06/21/2021    MONOPCT 14 06/21/2021    BASOPCT 0 06/21/2021    MONOSABS 0.43 06/21/2021    LYMPHSABS 1.50 06/21/2021    EOSABS 0.28 06/21/2021    BASOSABS 0.00 06/21/2021    DIFFTYPE NOT REPORTED 06/21/2021     BMP:    Lab Results   Component Value Date     06/25/2021    K 4.2 06/25/2021     06/25/2021    CO2 26 06/25/2021    BUN 10 06/25/2021    LABALBU 3.7 06/22/2021    CREATININE 0.68 06/25/2021    CALCIUM 8.2 06/25/2021    GFRAA >60 06/25/2021    LABGLOM >60 06/25/2021    GLUCOSE 108 06/25/2021     Magnesium:    Lab Results   Component Value Date    MG 1.3 06/25/2021     Radiology Review:  -  Other diagnostic test:  -    Disposition:   home  Follow up with Preet Edwards MD in 1 weeks.   Discharge patient to: Home    Electronically signed by Antonia Khan MD on 6/26/2021 at 10:40 AM

## 2021-06-26 NOTE — PLAN OF CARE
Problem: Pain:  Goal: Pain level will decrease  Description: Pain level will decrease  6/26/2021 0449 by Myron Dooley RN  Outcome: Ongoing  6/25/2021 1816 by Jed Perez RN  Outcome: Ongoing  Goal: Control of acute pain  Description: Control of acute pain  6/26/2021 0449 by Myron Dooley RN  Outcome: Ongoing  Note: Assess the location, characteristics, onset, duration, frequency, quality, and severity of pain. Encourage immediate report of pain. Use appropriate pain scale to rate pain. Manage pain using nonpharmacologic/pharmacologic interventions. 6/25/2021 1816 by Jed Perez RN  Outcome: Ongoing  Goal: Control of chronic pain  Description: Control of chronic pain  6/26/2021 0449 by Myron Dooley RN  Outcome: Ongoing  6/25/2021 1816 by Jed Perez RN  Outcome: Ongoing     Problem: Fluid Volume - Imbalance:  Goal: Absence of imbalanced fluid volume signs and symptoms  Description: Absence of imbalanced fluid volume signs and symptoms  6/26/2021 0449 by Myron Dooley RN  Outcome: Ongoing  6/25/2021 1816 by Jed Perez RN  Outcome: Ongoing     Problem: Falls - Risk of:  Goal: Will remain free from falls  Description: Will remain free from falls  6/26/2021 0449 by Myron Dooley RN  Outcome: Ongoing  Note: Patient remains free of falls and injuries throughout shift.  Bed remains in the lowest position, wheels locked, call light and bedside table are within reach.   6/25/2021 1816 by Jed Perez RN  Outcome: Ongoing  Goal: Absence of physical injury  Description: Absence of physical injury  6/26/2021 0449 by Myron Dooley RN  Outcome: Ongoing  6/25/2021 1816 by Jed Perez RN  Outcome: Ongoing

## 2021-06-26 NOTE — PLAN OF CARE
Problem: Pain:  Goal: Pain level will decrease  Description: Pain level will decrease  6/26/2021 1339 by Andre Simon RN  Outcome: Completed  6/26/2021 0449 by Amna Wade RN  Outcome: Ongoing  Goal: Control of acute pain  Description: Control of acute pain  6/26/2021 1339 by Andre Simon RN  Outcome: Completed  6/26/2021 0449 by Amna Wade RN  Outcome: Ongoing  Note: Assess the location, characteristics, onset, duration, frequency, quality, and severity of pain. Encourage immediate report of pain. Use appropriate pain scale to rate pain. Manage pain using nonpharmacologic/pharmacologic interventions. Goal: Control of chronic pain  Description: Control of chronic pain  6/26/2021 1339 by Andre Simon RN  Outcome: Completed  6/26/2021 0449 by Amna Wade RN  Outcome: Ongoing     Problem: Fluid Volume - Imbalance:  Goal: Absence of imbalanced fluid volume signs and symptoms  Description: Absence of imbalanced fluid volume signs and symptoms  6/26/2021 1339 by Andre Simon RN  Outcome: Completed  6/26/2021 0449 by Amna Wade RN  Outcome: Ongoing     Problem: Falls - Risk of:  Goal: Will remain free from falls  Description: Will remain free from falls  6/26/2021 1339 by Andre Simon RN  Outcome: Completed  6/26/2021 0449 by Amna Wade RN  Outcome: Ongoing  Note: Patient remains free of falls and injuries throughout shift. Bed remains in the lowest position, wheels locked, call light and bedside table are within reach.    Goal: Absence of physical injury  Description: Absence of physical injury  6/26/2021 1339 by Andre Simon RN  Outcome: Completed  6/26/2021 0449 by Amna Wade RN  Outcome: Ongoing

## 2021-06-26 NOTE — CARE COORDINATION
ONGOING DISCHARGE PLAN:    Patient is alert and oriented x4. Spoke with patient regarding discharge plan and patient confirms that plan is still home without needs. Declined VNS. Will be d/c'ed home today. Will continue to follow for additional discharge needs.     Electronically signed by Florencia Oliva RN on 6/26/2021 at 11:34 AM

## 2021-06-26 NOTE — PROGRESS NOTES
Dr. Aniyah Minor notified of repeat lab values. Patient is ok to discharge.
Dr. Gerardo Aguilera called this RN for an update on the patient. Dr[facility] Ger[facility] and questioned if it was for alcohol. Dr. Gerardo Aguilera asked that the attending that rounds in the morning be notified that the patient went to [facility] yesterday.
EEG is completed. Results to follow.
IV fluids     Patient IV fluids disconnected by patient request. Patient educated on IV fluids importance.      Patient seen walking to [facility]
MRI     MRI called and stated not till AM, Patient given MRI screening form and told to fill out and call when done to fax
Magnesium 1.4     Perfect serve sent to Dr. Fred Collins     N.OJenny for PRN Mag replacement
Medication History completed:    New medications: calcium-magnesium, potassium    Medications discontinued: none    Changes to dosing: none    Stated allergies: NKDA    Other pertinent information: Medications confirmed with patient's prescription vials.      Thank you,  Blake Cheema, PharmD, BCPS  [phone]
N.o.     Patient updated on New orders and medications
New orders received per Dr. Johanna Mattson.
Patient arrives to floor from ER. Oriented to room and call light system. Head to toe assessment completed, Admission database completed. Patient complaining of cramps throughout body. Patient's wife @ bedside. Will continue to monitor.
Patient complaining of muscle spasms and tightening in his calves, and mild chest pain and discomfort. Perfect serve sent to Dr. Michel Corea, waiting for response.
Pt not seen today as he was in middle of having his EEG. Asked nursing to discuss with pt that he is not supposed to leave hospital premises (he was witnessed going to [place] [id number] last night), and that if he is having any issues with nicotine or alcohol withdrawal that we can give him medication to help with that.
extremities normal, atraumatic, no cyanosis or edema  Neurologic: Mental status: Alert, oriented, thought content appropriate    Assessment and Plan:   1.  wSeizure- causing elevation in CK and rhabdo  2. rhabdo- levels are better and pain is better  3. hypomag- replaced with po and IV-still low- will send home on po bid  4. Alcohol abuse- with folic acid deficiency- home folic acid  5. Anxiety- will need somethin po long term, but will wait until stable otherwise  6.  HTN- probably due to anxiety- will see how that does once mag is better and anxiety is treated (lopressor started- should helpith anxiety as well)    Patient Active Problem List:     Seasonal allergies     Mild intermittent asthma without complication     Essential hypertension     History of stroke     High cholesterol     Medically noncompliant     Rhabdomyolysis      Electronically signed by Denise Falcon MD on 6/26/2021 at 10:37 AM

## 2021-06-28 ENCOUNTER — TELEPHONE (OUTPATIENT)
Dept: PRIMARY CARE CLINIC | Age: 41
End: 2021-06-28

## 2021-07-05 ENCOUNTER — APPOINTMENT (OUTPATIENT)
Dept: CT IMAGING | Age: 41
End: 2021-07-05
Payer: COMMERCIAL

## 2021-07-05 ENCOUNTER — APPOINTMENT (OUTPATIENT)
Dept: GENERAL RADIOLOGY | Age: 41
End: 2021-07-05
Payer: COMMERCIAL

## 2021-07-05 ENCOUNTER — HOSPITAL ENCOUNTER (EMERGENCY)
Age: 41
Discharge: HOME | End: 2021-07-05
Attending: EMERGENCY MEDICINE
Payer: COMMERCIAL

## 2021-07-05 VITALS
WEIGHT: 187 LBS | TEMPERATURE: 97 F | HEART RATE: 105 BPM | RESPIRATION RATE: 18 BRPM | HEIGHT: 75 IN | BODY MASS INDEX: 23.25 KG/M2 | SYSTOLIC BLOOD PRESSURE: 145 MMHG | OXYGEN SATURATION: 96 % | DIASTOLIC BLOOD PRESSURE: 89 MMHG

## 2021-07-05 DIAGNOSIS — S00.03XA CONTUSION OF SCALP, INITIAL ENCOUNTER: Primary | ICD-10-CM

## 2021-07-05 DIAGNOSIS — S09.90XA INJURY OF HEAD, INITIAL ENCOUNTER: ICD-10-CM

## 2021-07-05 LAB
ABSOLUTE EOS #: 0.2 K/UL (ref 0–0.4)
ABSOLUTE IMMATURE GRANULOCYTE: ABNORMAL K/UL (ref 0–0.3)
ABSOLUTE LYMPH #: 1.5 K/UL (ref 1–4.8)
ABSOLUTE MONO #: 0.3 K/UL (ref 0.1–1.3)
ALBUMIN SERPL-MCNC: 4.6 G/DL (ref 3.5–5.2)
ALBUMIN/GLOBULIN RATIO: ABNORMAL (ref 1–2.5)
ALP BLD-CCNC: 246 U/L (ref 40–129)
ALT SERPL-CCNC: 104 U/L (ref 5–41)
ANION GAP SERPL CALCULATED.3IONS-SCNC: 21 MMOL/L (ref 9–17)
AST SERPL-CCNC: 288 U/L
BASOPHILS # BLD: 0 % (ref 0–2)
BASOPHILS ABSOLUTE: 0 K/UL (ref 0–0.2)
BILIRUB SERPL-MCNC: 0.82 MG/DL (ref 0.3–1.2)
BUN BLDV-MCNC: 11 MG/DL (ref 6–20)
BUN/CREAT BLD: ABNORMAL (ref 9–20)
CALCIUM SERPL-MCNC: 8.9 MG/DL (ref 8.6–10.4)
CHLORIDE BLD-SCNC: 99 MMOL/L (ref 98–107)
CO2: 23 MMOL/L (ref 20–31)
CREAT SERPL-MCNC: 0.87 MG/DL (ref 0.7–1.2)
DIFFERENTIAL TYPE: ABNORMAL
EOSINOPHILS RELATIVE PERCENT: 5 % (ref 0–4)
GFR AFRICAN AMERICAN: >60 ML/MIN
GFR NON-AFRICAN AMERICAN: >60 ML/MIN
GFR SERPL CREATININE-BSD FRML MDRD: ABNORMAL ML/MIN/{1.73_M2}
GFR SERPL CREATININE-BSD FRML MDRD: ABNORMAL ML/MIN/{1.73_M2}
GLUCOSE BLD-MCNC: 94 MG/DL (ref 70–99)
HCT VFR BLD CALC: 38.2 % (ref 41–53)
HEMOGLOBIN: 12.6 G/DL (ref 13.5–17.5)
IMMATURE GRANULOCYTES: ABNORMAL %
LYMPHOCYTES # BLD: 38 % (ref 24–44)
MCH RBC QN AUTO: 26.6 PG (ref 26–34)
MCHC RBC AUTO-ENTMCNC: 32.9 G/DL (ref 31–37)
MCV RBC AUTO: 80.8 FL (ref 80–100)
MONOCYTES # BLD: 9 % (ref 1–7)
NRBC AUTOMATED: ABNORMAL PER 100 WBC
PDW BLD-RTO: 16.8 % (ref 11.5–14.9)
PLATELET # BLD: 151 K/UL (ref 150–450)
PLATELET ESTIMATE: ABNORMAL
PMV BLD AUTO: 7 FL (ref 6–12)
POTASSIUM SERPL-SCNC: 3.9 MMOL/L (ref 3.7–5.3)
RBC # BLD: 4.73 M/UL (ref 4.5–5.9)
RBC # BLD: ABNORMAL 10*6/UL
SEG NEUTROPHILS: 48 % (ref 36–66)
SEGMENTED NEUTROPHILS ABSOLUTE COUNT: 2 K/UL (ref 1.3–9.1)
SODIUM BLD-SCNC: 143 MMOL/L (ref 135–144)
TOTAL PROTEIN: 7.5 G/DL (ref 6.4–8.3)
WBC # BLD: 4.1 K/UL (ref 3.5–11)
WBC # BLD: ABNORMAL 10*3/UL

## 2021-07-05 PROCEDURE — 36415 COLL VENOUS BLD VENIPUNCTURE: CPT

## 2021-07-05 PROCEDURE — 6370000000 HC RX 637 (ALT 250 FOR IP): Performed by: STUDENT IN AN ORGANIZED HEALTH CARE EDUCATION/TRAINING PROGRAM

## 2021-07-05 PROCEDURE — 70450 CT HEAD/BRAIN W/O DYE: CPT

## 2021-07-05 PROCEDURE — 96374 THER/PROPH/DIAG INJ IV PUSH: CPT

## 2021-07-05 PROCEDURE — 73521 X-RAY EXAM HIPS BI 2 VIEWS: CPT

## 2021-07-05 PROCEDURE — 85025 COMPLETE CBC W/AUTO DIFF WBC: CPT

## 2021-07-05 PROCEDURE — 6360000002 HC RX W HCPCS: Performed by: STUDENT IN AN ORGANIZED HEALTH CARE EDUCATION/TRAINING PROGRAM

## 2021-07-05 PROCEDURE — 80053 COMPREHEN METABOLIC PANEL: CPT

## 2021-07-05 PROCEDURE — 99283 EMERGENCY DEPT VISIT LOW MDM: CPT

## 2021-07-05 PROCEDURE — 72125 CT NECK SPINE W/O DYE: CPT

## 2021-07-05 RX ORDER — CYCLOBENZAPRINE HCL 5 MG
5 TABLET ORAL 2 TIMES DAILY PRN
Qty: 10 TABLET | Refills: 0 | Status: SHIPPED | OUTPATIENT
Start: 2021-07-05 | End: 2021-07-08 | Stop reason: SINTOL

## 2021-07-05 RX ORDER — IBUPROFEN 600 MG/1
600 TABLET ORAL 3 TIMES DAILY PRN
Qty: 30 TABLET | Refills: 0 | Status: SHIPPED | OUTPATIENT
Start: 2021-07-05

## 2021-07-05 RX ORDER — MORPHINE SULFATE 4 MG/ML
4 INJECTION, SOLUTION INTRAMUSCULAR; INTRAVENOUS ONCE
Status: COMPLETED | OUTPATIENT
Start: 2021-07-05 | End: 2021-07-05

## 2021-07-05 RX ORDER — OXYCODONE HYDROCHLORIDE 5 MG/1
5 TABLET ORAL ONCE
Status: COMPLETED | OUTPATIENT
Start: 2021-07-05 | End: 2021-07-05

## 2021-07-05 RX ADMIN — OXYCODONE HYDROCHLORIDE 5 MG: 5 TABLET ORAL at 21:03

## 2021-07-05 RX ADMIN — Medication 4 MG: at 20:25

## 2021-07-05 ASSESSMENT — PAIN SCALES - GENERAL
PAINLEVEL_OUTOF10: 9
PAINLEVEL_OUTOF10: 9
PAINLEVEL_OUTOF10: 10

## 2021-07-05 ASSESSMENT — ENCOUNTER SYMPTOMS
NAUSEA: 0
ABDOMINAL PAIN: 0
VOMITING: 0

## 2021-07-05 NOTE — ED PROVIDER NOTES
EMERGENCY DEPARTMENT ENCOUNTER   ATTENDING ATTESTATION     Pt Name: Molly Recio  MRN: [id number]  [place] 1980  Date of evaluation: 7/5/21       Molly Recio is a 47 y.o. male who presents with Head Injury (struck with large log while cutting down a tree at work this morning 0700)      MDM: 62-year-old male presents with head injury after being hit in head with a log, on initial exam patient in no acute distress, vitals are stable, no focal neuro deficits on exam, will check CT head CT C-spine and hip x-rays    Imaging reviewed and negative for acute process, labs were reviewed patient was found to have mild elevations of his AST and ALT, these have been present before, patient does report alcohol use, suspect that this is likely due to his alcohol use, discussed results of the CT scans with the patient and family, discussed continued supportive care at home and need for follow-up with his primary care physician as well as GI, patient and family voiced understanding comfortable plan and discharge home    Patient/Guardian was informed of their diagnosis and told to follow up with PCP & GI in 1-3 days. Patient demonstrates understanding and agreement with the plan. They were given the opportunity to ask questions and those questions were answered to the best of our ability with the available information. Patient/Guardian told to return to the ED for any new, worsening, changing or persistent symptoms. This dictation was prepared using Switchboard voice recognition software. Vitals:   Vitals:    07/05/21 1751   BP: (!) 145/89   Pulse: 105   Resp: 18   Temp: 97 °F (36.1 °C)   TempSrc: Temporal   SpO2: 96%   Weight: 187 lb (84.8 kg)   Height: 6' 3\" (1.905 m)         I personally evaluated and examined the patient in conjunction with the resident and agree with the assessment, treatment plan, and disposition of the patient as recorded by the resident.     I performed a history and physical examination of the patient and discussed management with the resident. I reviewed the residents note and agree with the documented findings and plan of care. Any areas of disagreement are noted on the chart. I was personally present for the key portions of any procedures. I have documented in the chart those procedures where I was not present during the key portions. I have personally reviewed all images and agree with the resident's interpretation. I have reviewed the emergency nurses triage note. I agree with the chief complaint, past medical history, past surgical history, allergies, medications, social and family history as documented unless otherwise noted.     Reji Paul DO  Attending Emergency Physician          Reji Paul DO  07/06/21 1048

## 2021-07-05 NOTE — ED PROVIDER NOTES
[address] ED  Emergency Department Encounter  Emergency Medicine Resident     Pt Name: Michelle Hays  MRN: [id number]  [place] 1980  Date of evaluation: 7/5/21  PCP:  Aleyda Zayas MD    CHIEF COMPLAINT       Chief Complaint   Patient presents with   [facility] Injury     struck with large log while cutting down a tree at work this morning 0700       HISTORY OFPRESENT ILLNESS  (Location/Symptom, Timing/Onset, Context/Setting, Quality, Duration, Modifying Factors,Severity.)      Michelle Hays is a 47 y. o.yo male who presents with complaints of headache and cervical spine pain after he was struck by a log while cutting down a tree this morning around 9 AM.  Patient states that he is unsure whether he had LOC, denies any anticoagulation or antiplatelet use. He denies any change in his vision but states that he is feeling extremely lethargic as though he wants to fall asleep. Denies any chest pain, shortness of breath, abdominal pain or nausea or vomiting. Patient also complaining of right hip/pelvic pain after incident. Of note patient states that he has had multiple brain bleeds. States he does not take any medication daily. PAST MEDICAL / SURGICAL / SOCIAL / FAMILY HISTORY      has a past medical history of Asthma, Essential hypertension, High cholesterol, and Seasonal allergies. has a past surgical history that includes transesophageal echocardiogram (9/13/14).      Social History     Socioeconomic History    Marital status:      Spouse name: Not on file    Number of children: Not on file    Years of education: Not on file    Highest education level: Not on file   Occupational History    Not on file   Tobacco Use    Smoking status: Never Smoker    Smokeless tobacco: Never Used   Substance and Sexual Activity    Alcohol use: Yes     Comment: Pt states he last drank on 5/30/2021    Drug use: No    Sexual activity: Not on file   Other Topics Concern    Not on file   Social History Narrative    Not on file     Social Determinants of Health     Financial Resource Strain: Low Risk     Difficulty of Paying Living Expenses: Not hard at all   Food Insecurity: No Food Insecurity    Worried About Running Out of Food in the Last Year: Never true    [address] in the Last Year: Never true   Transportation Needs:     Lack of Transportation (Medical):  Lack of Transportation (Non-Medical):    Physical Activity:     Days of Exercise per Week:     Minutes of Exercise per Session:    Stress:     Feeling of Stress :    Social Connections:     Frequency of Communication with Friends and Family:     Frequency of Social Gatherings with Friends and Family:     Attends [employer] Services:     Active Member of Clubs or Organizations:     Attends Club or Organization Meetings:     Marital Status:    Intimate Partner Violence:     Fear of Current or Ex-Partner:     Emotionally Abused:     Physically Abused:     Sexually Abused:        Family History   Problem Relation Age of Onset    Stroke Maternal Grandmother         cousin    High Blood Pressure Maternal Grandmother     Cancer Other         G. Mother , Cervix    Alcohol Abuse Father         G. Parents, Vaughn Davis. Allergies:  Patient has no known allergies. Home Medications:  Prior to Admission medications    Medication Sig Start Date End Date Taking?  Authorizing Provider   ibuprofen (ADVIL;MOTRIN) 600 MG tablet Take 1 tablet by mouth 3 times daily as needed for Pain 7/5/21  Yes Lamont Saavedra MD   cyclobenzaprine (FLEXERIL) 5 MG tablet Take 1 tablet by mouth 2 times daily as needed for Muscle spasms 7/5/21 7/15/21 Yes Raina Zepeda MD   hydrOXYzine (ATARAX) 25 MG tablet Take 1 tablet by mouth every 4 hours as needed for Anxiety 6/26/21 7/6/21  Amie Mao MD   folic acid (FOLVITE) 1 MG tablet Take 1 tablet by mouth daily 6/26/21   Amie Mao MD   magnesium oxide (MAG-OX) 400 (241.3 Mg) MG TABS tablet Take 1 tablet by mouth 2 times daily 6/26/21   Lynn Stratton MD   Potassium 99 MG TABS Take 99 mg by mouth as needed     Historical Provider, MD   cetirizine (ZYRTEC) 10 MG tablet Take 10 mg by mouth daily    Historical Provider, MD   metoprolol tartrate (LOPRESSOR) 25 MG tablet Take 0.5 tablets by mouth 2 times daily 6/3/21   Luis Mattson, APRN - CNP   albuterol sulfate HFA (PROAIR HFA) 108 (90 Base) MCG/ACT inhaler Inhale 2 puffs into the lungs every 6 hours as needed for Wheezing or Shortness of Breath 4/24/20   Jacquie Potter MD       REVIEW OFSYSTEMS    (2-9 systems for level 4, 10 or more for level 5)      Review of Systems   Constitutional: Positive for fatigue. Gastrointestinal: Negative for abdominal pain, nausea and vomiting. Musculoskeletal: Positive for neck pain and neck stiffness. Skin: Positive for wound. Neurological: Positive for headaches. PHYSICAL EXAM   (up to 7 for level 4, 8 or more forlevel 5)      INITIAL VITALS:   ED Triage Vitals [07/05/21 1751]   BP Temp Temp Source Pulse Resp SpO2 Height Weight   (!) 145/89 97 °F (36.1 °C) Temporal 105 18 96 % 6' 3\" (1.905 m) 187 lb (84.8 kg)       Physical Exam  HENT:      Head: Abrasion present. No raccoon eyes or Ron's sign. Jaw: Swelling present. Right Ear: Tympanic membrane normal.      Left Ear: Tympanic membrane normal.      Mouth/Throat:      Mouth: Mucous membranes are moist.   Eyes:      Extraocular Movements: Extraocular movements intact. Pupils: Pupils are equal, round, and reactive to light. Comments: Left conjunctival injection   Cardiovascular:      Rate and Rhythm: Regular rhythm. Tachycardia present. Heart sounds: No murmur heard. Pulmonary:      Effort: Pulmonary effort is normal. No respiratory distress. Abdominal:      General: There is no distension. Palpations: Abdomen is soft. Tenderness: There is no abdominal tenderness. Musculoskeletal:         General: Swelling present. Cervical back: Rigidity and tenderness present. Comments: Presence of bruising and swelling over right hip   Skin:     Capillary Refill: Capillary refill takes less than 2 seconds. Findings: Bruising present. Neurological:      General: No focal deficit present. Mental Status: He is alert and oriented to person, place, and time. Psychiatric:         Mood and Affect: Mood normal.         Behavior: Behavior normal.         DIFFERENTIAL  DIAGNOSIS     PLAN (LABS / IMAGING / EKG):  Orders Placed This Encounter   Procedures    CT Head WO Contrast    CT Cervical Spine WO Contrast    XR HIP BILATERAL W AP PELVIS (2 VIEWS)    CBC Auto Differential    Comprehensive Metabolic Panel w/ Reflex to MG    Insert peripheral IV       MEDICATIONS ORDERED:  Orders Placed This Encounter   Medications    morphine sulfate (PF) injection 4 mg    ibuprofen (ADVIL;MOTRIN) 600 MG tablet     Sig: Take 1 tablet by mouth 3 times daily as needed for Pain     Dispense:  30 tablet     Refill:  0    cyclobenzaprine (FLEXERIL) 5 MG tablet     Sig: Take 1 tablet by mouth 2 times daily as needed for Muscle spasms     Dispense:  10 tablet     Refill:  0    oxyCODONE (ROXICODONE) immediate release tablet 5 mg       DDX: Subdural versus epidural hematoma versus cervical neck fracture versus hip/pelvic fracture    Initial MDM/Plan: 47 y.o. male who presents with complaints of headache, cervical neck pain, and right hip pain after he was struck by a LOC earlier this morning. On presentation, patient is awake, alert and oriented however his responses to questions are slow. Patient with obvious signs of head trauma including posterior scalp trauma, left temporal scalp abrasion, left conjunctival injection but no periorbital ecchymosis, no posterior auricular ecchymosis, no hemotympanum. Patient with cervical neck tenderness, no thoracic or lumbar spinal tenderness.   No abrasion or bruises over anterior chest, radial and dorsalis pedal pulses intact bilaterally. Abdomen soft nontender nondistended. Absence of bruising and swelling over right hip. Patient with right hip pelvic tenderness palpation. Intact upper and lower extremities and sensation. Due to concern of epidural versus subdural hematoma plan for CT head. Obtain cervical neck CT to rule out cervical neck fractures, patient placed in c-collar. Will also obtain x-ray of hip and pelvis bilaterally. Basic labs ordered, patient given morphine for pain control. Disposition pending labs and images    DIAGNOSTIC RESULTS / EMERGENCYDEPARTMENT COURSE / MDM     LABS:  Labs Reviewed   CBC WITH AUTO DIFFERENTIAL - Abnormal; Notable for the following components:       Result Value    Hemoglobin 12.6 (*)     Hematocrit 38.2 (*)     RDW 16.8 (*)     Monocytes 9 (*)     Eosinophils % 5 (*)     All other components within normal limits   COMPREHENSIVE METABOLIC PANEL W/ REFLEX TO MG FOR LOW K - Abnormal; Notable for the following components:    Anion Gap 21 (*)     Alkaline Phosphatase 246 (*)      (*)      (*)     All other components within normal limits         RADIOLOGY:  XR HIP BILATERAL W AP PELVIS (2 VIEWS)    Result Date: 7/5/2021  EXAMINATION: ONE XRAY VIEW OF THE PELVIS AND TWO XRAY VIEWS OF EACH OF THE BILATERAL HIPS 7/5/2021 5:19 pm COMPARISON: None. HISTORY: ORDERING SYSTEM PROVIDED HISTORY: pelvic pain after tree fell on patient TECHNOLOGIST PROVIDED HISTORY: pelvic pain after tree fell on patient Reason for Exam: pelvic pain after tree fell on pt this morning, worse on the right side Acuity: Acute Type of Exam: Initial FINDINGS: A frontal view pelvic radiograph was obtained, along with frontal and lateral view radiographs of the right and left hip. Bone mineralization is normal.  There is no evidence of acute fracture or dislocation. Joint relationships are maintained.   There is a mild degree of frontoethmoidal junction. The visualized extracranial structures including paranasal sinuses and mastoid air cells are unremarkable. No fracture is identified. Cervical spine CT: BONES/ALIGNMENT: There is no acute fracture or traumatic malalignment. DEGENERATIVE CHANGES: No high-grade canal or foraminal stenosis. At C3-C4, central disc protrusion. At C5-C6, right paracentral disc protrusion effaces the anterior thecal sac. SOFT TISSUES: There is no prevertebral soft tissue swelling. Head CT: No acute intracranial abnormality. No evidence for acute intracranial hemorrhage, territorial infarction or intracranial mass lesion. Chronic encephalomalacia involving the superior aspect of right cerebellar hemisphere. Cervical CT: No acute osseous abnormality of the cervical spine. No fracture. At C3-C4, central disc protrusion. At C5-C6, right paracentral disc protrusion effaces the anterior thecal sac. CT Cervical Spine WO Contrast    Result Date: 7/5/2021  EXAMINATION: CT OF THE HEAD WITHOUT CONTRAST; CT OF THE CERVICAL SPINE WITHOUT CONTRAST 7/5/2021 6:53 pm TECHNIQUE: CT of the head was performed without the administration of intravenous contrast. Dose modulation, iterative reconstruction, and/or weight based adjustment of the mA/kV was utilized to reduce the radiation dose to as low as reasonably achievable.; CT of the cervical spine was performed without the administration of intravenous contrast. Multiplanar reformatted images are provided for review. Dose modulation, iterative reconstruction, and/or weight based adjustment of the mA/kV was utilized to reduce the radiation dose to as low as reasonably achievable.  COMPARISON: Brain MRI of 06/24/2020 HISTORY: ORDERING SYSTEM PROVIDED HISTORY: log fell on head TECHNOLOGIST PROVIDED HISTORY: log fell on head Decision Support Exception - unselect if not a suspected or confirmed emergency medical condition->Emergency Medical Condition (MA) Reason for Exam: Log fell on head at work Acuity: Acute Type of Exam: Initial FINDINGS: Head CT: There is no acute infarction, intracranial hemorrhage or intracranial mass lesion. No mass effect, midline shift or extra-axial collection is noted. Chronic encephalomalacia involving the superior aspect right cerebellar hemisphere. The brain parenchyma is normal. The cerebellar tonsils are in normal position. The ventricles, sulci, and cisterns are within normal limits in size and configuration. The globes and orbits are within normal limits. Moderate mucosal thickening of the ethmoidal air cells and mild mucosal thickening of the left frontoethmoidal junction. The visualized extracranial structures including paranasal sinuses and mastoid air cells are unremarkable. No fracture is identified. Cervical spine CT: BONES/ALIGNMENT: There is no acute fracture or traumatic malalignment. DEGENERATIVE CHANGES: No high-grade canal or foraminal stenosis. At C3-C4, central disc protrusion. At C5-C6, right paracentral disc protrusion effaces the anterior thecal sac. SOFT TISSUES: There is no prevertebral soft tissue swelling. Head CT: No acute intracranial abnormality. No evidence for acute intracranial hemorrhage, territorial infarction or intracranial mass lesion. Chronic encephalomalacia involving the superior aspect of right cerebellar hemisphere. Cervical CT: No acute osseous abnormality of the cervical spine. No fracture. At C3-C4, central disc protrusion. At C5-C6, right paracentral disc protrusion effaces the anterior thecal sac. EKG      All EKG's are interpreted by the Emergency Department Physicianwho either signs or Co-signs this chart in the absence of a cardiologist.    EMERGENCY DEPARTMENT COURSE:  ED Course as of Jul 06 0100   Mon Jul 05, 2021   1921 CT head shows No acute intracranial abnormality. No evidence for acute  intracranial hemorrhage, territorial infarction or intracranial mass lesion.  Chronic encephalomalacia involving the superior aspect of right cerebellar hemisphere. Cervical CT: No acute osseous abnormality of the cervical spine. No fracture. Xray of hips/pelvis shows mild bilateral hip osteoarthritis. No acute fracture or hip dislocation noted. [AN]   1959 Patient updated on results. C-collar cleared and he has intact ROM of neck with no tenderness. Plan to discharge patient with close PCP follow-up. Patient given strict return percuation if his symptoms are worsening to come back to the ED, patient expresses understanding.    [AN]      ED Course User Index  [AN] Sebastian Weiner MD          PROCEDURES:  None    CONSULTS:  None    CRITICAL CARE:      FINAL IMPRESSION      1. Contusion of scalp, initial encounter    2. Injury of head, initial encounter          DISPOSITION / PLAN     DISPOSITION        PATIENT REFERRED TO:  No follow-up provider specified.     DISCHARGE MEDICATIONS:  Discharge Medication List as of 7/5/2021  8:51 PM      START taking these medications    Details   ibuprofen (ADVIL;MOTRIN) 600 MG tablet Take 1 tablet by mouth 3 times daily as needed for Pain, Disp-30 tablet, R-0Print      cyclobenzaprine (FLEXERIL) 5 MG tablet Take 1 tablet by mouth 2 times daily as needed for Muscle spasms, Disp-10 tablet, R-0Print             Sebastian Weiner MD  Emergency Medicine Resident    (Please note that portions of this note were completed with a voice recognition program.Efforts were made to edit the dictations but occasionally words are mis-transcribed.)        Sebastian Weiner MD  Resident  07/06/21 0100

## 2021-07-08 ENCOUNTER — OFFICE VISIT (OUTPATIENT)
Dept: PRIMARY CARE CLINIC | Age: 41
End: 2021-07-08
Payer: COMMERCIAL

## 2021-07-08 VITALS
HEART RATE: 84 BPM | WEIGHT: 187 LBS | BODY MASS INDEX: 23.37 KG/M2 | OXYGEN SATURATION: 97 % | SYSTOLIC BLOOD PRESSURE: 122 MMHG | DIASTOLIC BLOOD PRESSURE: 86 MMHG

## 2021-07-08 DIAGNOSIS — R63.0 POOR APPETITE: ICD-10-CM

## 2021-07-08 DIAGNOSIS — I10 ESSENTIAL HYPERTENSION: ICD-10-CM

## 2021-07-08 DIAGNOSIS — R74.8 ELEVATED LIVER ENZYMES: ICD-10-CM

## 2021-07-08 DIAGNOSIS — R10.12 LUQ PAIN: ICD-10-CM

## 2021-07-08 DIAGNOSIS — M62.82 NON-TRAUMATIC RHABDOMYOLYSIS: ICD-10-CM

## 2021-07-08 DIAGNOSIS — F10.10 ALCOHOL ABUSE: Primary | ICD-10-CM

## 2021-07-08 PROCEDURE — 1111F DSCHRG MED/CURRENT MED MERGE: CPT | Performed by: NURSE PRACTITIONER

## 2021-07-08 PROCEDURE — 99214 OFFICE O/P EST MOD 30 MIN: CPT | Performed by: NURSE PRACTITIONER

## 2021-07-08 RX ORDER — MIRTAZAPINE 7.5 MG/1
7.5 TABLET, FILM COATED ORAL NIGHTLY
Qty: 30 TABLET | Refills: 1 | Status: SHIPPED | OUTPATIENT
Start: 2021-07-08 | End: 2021-07-22 | Stop reason: SDUPTHER

## 2021-07-08 RX ORDER — FOLIC ACID 1 MG/1
1 TABLET ORAL DAILY
Qty: 30 TABLET | Refills: 3
Start: 2021-07-08 | End: 2021-09-02

## 2021-07-08 ASSESSMENT — ENCOUNTER SYMPTOMS
COUGH: 0
EYE REDNESS: 0
EYE PAIN: 0
NAUSEA: 0
DIARRHEA: 0
CONSTIPATION: 0
SHORTNESS OF BREATH: 0
BACK PAIN: 0

## 2021-07-08 ASSESSMENT — PATIENT HEALTH QUESTIONNAIRE - PHQ9
SUM OF ALL RESPONSES TO PHQ QUESTIONS 1-9: 1
SUM OF ALL RESPONSES TO PHQ QUESTIONS 1-9: 1
1. LITTLE INTEREST OR PLEASURE IN DOING THINGS: 0
2. FEELING DOWN, DEPRESSED OR HOPELESS: 1
SUM OF ALL RESPONSES TO PHQ QUESTIONS 1-9: 1
SUM OF ALL RESPONSES TO PHQ9 QUESTIONS 1 & 2: 1

## 2021-07-08 NOTE — PROGRESS NOTES
[facility] PRIMARY CARE  [address]  [facility] [address]  Dept: Pr-2 George Narayan is a 47 y.o. male Established patient, who presents today for his medical conditions/complaints as noted below. Chief Complaint   Patient presents with    Head Injury     ED follow up    Results     concerned about elevated ALT & AST       HPI:     HPI  It is unclear if he is drinking. He states he is not drinking. However wife has found three empty bottles recently. Wife states he is drinking. She has found bottles in house and car. States the Ativan makes him black out   Muscle relaxant also makes him slow  He was at [facility]. [facility] 6/21-6/26/21 due to seizure causing elevated CK and rhabdomyolysis  He was in the ER on 7/5/21 due to being struck by log at work. He goes to [facility] treatment every morning. He runs 3-5 miles every day. He estimates he is eating less than 1000 calories a day.       Reviewed prior notes    Reviewed previous Labs, Imaging and Hospital Records 6/21/21 & 7/5/21    LDL Cholesterol (mg/dL)   Date Value   09/12/2014 111     LDL Calculated (mg/dL)   Date Value   04/09/2018 195 (A)       (goal LDL is <100)   AST (U/L)   Date Value   07/05/2021 288 (H)     ALT (U/L)   Date Value   07/05/2021 104 (H)     BUN (mg/dL)   Date Value   07/05/2021 11     TSH (mIU/L)   Date Value   06/22/2021 1.76     BP Readings from Last 3 Encounters:   07/08/21 122/86   07/05/21 (!) 145/89   06/26/21 (!) 146/95          (goal 120/80)    Past Medical History:   Diagnosis Date    Asthma     Essential hypertension 4/2/2018    High cholesterol 6/23/2018    Seasonal allergies       Past Surgical History:   Procedure Laterality Date    TRANSESOPHAGEAL ECHOCARDIOGRAM  9/13/14       Family History   Problem Relation Age of Onset    Stroke Maternal Grandmother         cousin    High Blood Pressure Maternal Grandmother     Cancer Other         G. Mother , Cervix    Alcohol Abuse Father         G. Parents, Jad Goyal. Social History     Tobacco Use    Smoking status: Never Smoker    Smokeless tobacco: Never Used   Substance Use Topics    Alcohol use: Yes     Comment: Pt states he last drank on 5/30/2021      Current Outpatient Medications   Medication Sig Dispense Refill    metoprolol tartrate (LOPRESSOR) 25 MG tablet Take 0.5 tablets by mouth 2 times daily 60 tablet 0    folic acid (FOLVITE) 1 MG tablet Take 1 tablet by mouth daily 30 tablet 3    mirtazapine (REMERON) 7.5 MG tablet Take 1 tablet by mouth nightly 30 tablet 1    ibuprofen (ADVIL;MOTRIN) 600 MG tablet Take 1 tablet by mouth 3 times daily as needed for Pain 30 tablet 0    magnesium oxide (MAG-OX) 400 (241.3 Mg) MG TABS tablet Take 1 tablet by mouth 2 times daily 60 tablet 3    Potassium 99 MG TABS Take 99 mg by mouth as needed       cetirizine (ZYRTEC) 10 MG tablet Take 10 mg by mouth daily      albuterol sulfate HFA (PROAIR HFA) 108 (90 Base) MCG/ACT inhaler Inhale 2 puffs into the lungs every 6 hours as needed for Wheezing or Shortness of Breath 1 Inhaler 3     No current facility-administered medications for this visit. No Known Allergies    Health Maintenance   Topic Date Due    Hepatitis C screen  Never done    Varicella vaccine (1 of 2 - 2-dose childhood series) Never done    Pneumococcal 0-64 years Vaccine (1 of 2 - PPSV23) Never done    COVID-19 Vaccine (1) Never done    HIV screen  Never done    DTaP/Tdap/Td vaccine (1 - Tdap) Never done    Flu vaccine (1) 09/01/2021    Potassium monitoring  07/05/2022    Creatinine monitoring  07/05/2022    Hepatitis A vaccine  Aged Out    Hepatitis B vaccine  Aged Out    Hib vaccine  Aged Out    Meningococcal (ACWY) vaccine  Aged Out       Subjective:      Review of Systems   Constitutional: Negative for chills, fatigue and fever. HENT: Negative for congestion and ear pain.     Eyes: Negative for pain and redness. Respiratory: Negative for cough and shortness of breath. Cardiovascular: Negative for chest pain, palpitations and leg swelling. Gastrointestinal: Negative for constipation, diarrhea and nausea. Genitourinary: Negative for difficulty urinating and dysuria. Musculoskeletal: Negative for arthralgias and back pain. Skin: Positive for wound. Neurological: Negative for dizziness, light-headedness and headaches. Psychiatric/Behavioral: Negative for dysphoric mood and sleep disturbance. The patient is nervous/anxious. Objective:     /86   Pulse 84   Wt 187 lb (84.8 kg)   SpO2 97%   BMI 23.37 kg/m²   Physical Exam  Vitals and nursing note reviewed. Constitutional:       Appearance: Normal appearance. HENT:      Head: Normocephalic and atraumatic. Right Ear: Tympanic membrane and external ear normal.   Cardiovascular:      Rate and Rhythm: Normal rate and regular rhythm. Heart sounds: Normal heart sounds. Abdominal:      General: Bowel sounds are normal.      Palpations: Abdomen is soft. Musculoskeletal:      Cervical back: Normal range of motion and neck supple. Right lower leg: No edema. Left lower leg: No edema. Skin:     General: Skin is warm and dry. Comments: Healing laceration left scalp  Diminishing hematoma occipital scalp  Hematoma left hip   Neurological:      General: No focal deficit present. Mental Status: He is alert. Psychiatric:         Thought Content: Thought content normal.         Judgment: Judgment normal.         Assessment/Plan:   1. Alcohol abuse  -     Amylase; Future  -     Lipase; Future  -     folic acid (FOLVITE) 1 MG tablet; Take 1 tablet by mouth daily, Disp-30 tablet, R-3NO PRINT  2. LUQ pain  -     Comprehensive Metabolic Panel, Fasting; Future  -     Amylase; Future  -     Lipase; Future  3. Elevated liver enzymes  -     Comprehensive Metabolic Panel, Fasting;  Future  -     Hepatitis Panel, Acute; Future  4. Non-traumatic rhabdomyolysis  5. Essential hypertension  -     metoprolol tartrate (LOPRESSOR) 25 MG tablet; Take 0.5 tablets by mouth 2 times daily, Disp-60 tablet, R-0NO PRINT  6. Poor appetite  -     mirtazapine (REMERON) 7.5 MG tablet; Take 1 tablet by mouth nightly, Disp-30 tablet, R-1Normal     Start mirtazapine  Complete lab work  Continue folic acid supplement. Return in about 2 weeks (around 7/22/2021) for anxiety. Orders Placed This Encounter   Procedures    Comprehensive Metabolic Panel, Fasting     Standing Status:   Future     Standing Expiration Date:   7/8/2022    Amylase     Standing Status:   Future     Standing Expiration Date:   7/8/2022    Lipase     Standing Status:   Future     Standing Expiration Date:   7/8/2022    Hepatitis Panel, Acute     Standing Status:   Future     Standing Expiration Date:   7/8/2022     Orders Placed This Encounter   Medications    metoprolol tartrate (LOPRESSOR) 25 MG tablet     Sig: Take 0.5 tablets by mouth 2 times daily     Dispense:  60 tablet     Refill:  0    folic acid (FOLVITE) 1 MG tablet     Sig: Take 1 tablet by mouth daily     Dispense:  30 tablet     Refill:  3    mirtazapine (REMERON) 7.5 MG tablet     Sig: Take 1 tablet by mouth nightly     Dispense:  30 tablet     Refill:  1       Patient given educational materials - see patient instructions. Discussed use, benefit, and side effects of prescribed medications. All patient questions answered. Pt voiced understanding. Reviewed health maintenance. Instructed to continue current medications, diet and exercise. Patient agreed with treatment plan. Follow up as directed.      Electronically signed by AAMIR Kevin CNP on 7/8/2021 at 9:52 AM

## 2021-07-16 LAB
ALBUMIN SERPL-MCNC: NORMAL G/DL
ALP BLD-CCNC: NORMAL U/L
ALT SERPL-CCNC: NORMAL U/L
AMYLASE: NORMAL
AST SERPL-CCNC: NORMAL U/L
BILIRUB SERPL-MCNC: NORMAL MG/DL
BUN BLDV-MCNC: NORMAL MG/DL
CALCIUM SERPL-MCNC: NORMAL MG/DL
CHLORIDE BLD-SCNC: NORMAL MMOL/L
CO2: NORMAL
CREAT SERPL-MCNC: NORMAL MG/DL
GLUCOSE FASTING: NORMAL
LIPASE: NORMAL
POTASSIUM SERPL-SCNC: NORMAL MMOL/L
SODIUM BLD-SCNC: NORMAL MMOL/L
TOTAL PROTEIN: NORMAL

## 2021-07-22 ENCOUNTER — OFFICE VISIT (OUTPATIENT)
Dept: PRIMARY CARE CLINIC | Age: 41
End: 2021-07-22
Payer: COMMERCIAL

## 2021-07-22 VITALS
DIASTOLIC BLOOD PRESSURE: 98 MMHG | OXYGEN SATURATION: 98 % | WEIGHT: 184 LBS | HEART RATE: 87 BPM | BODY MASS INDEX: 23 KG/M2 | SYSTOLIC BLOOD PRESSURE: 122 MMHG

## 2021-07-22 DIAGNOSIS — Z79.899 HIGH RISK MEDICATION USE: ICD-10-CM

## 2021-07-22 DIAGNOSIS — R63.0 POOR APPETITE: ICD-10-CM

## 2021-07-22 DIAGNOSIS — F41.9 ANXIETY: ICD-10-CM

## 2021-07-22 DIAGNOSIS — Z79.899 MEDICATION MANAGEMENT: Primary | ICD-10-CM

## 2021-07-22 DIAGNOSIS — I10 ESSENTIAL HYPERTENSION: ICD-10-CM

## 2021-07-22 LAB
ALCOHOL URINE: NEGATIVE
AMPHETAMINE SCREEN, URINE: NEGATIVE
BARBITURATE SCREEN, URINE: NEGATIVE
BENZODIAZEPINE SCREEN, URINE: POSITIVE
BUPRENORPHINE URINE: NEGATIVE
COCAINE METABOLITE SCREEN URINE: NEGATIVE
FENTANYL SCREEN, URINE: NEGATIVE
GABAPENTIN SCREEN, URINE: NEGATIVE
MDMA URINE: NEGATIVE
METHADONE SCREEN, URINE: NEGATIVE
METHAMPHETAMINE, URINE: NEGATIVE
OPIATE SCREEN URINE: NEGATIVE
OXYCODONE SCREEN URINE: NEGATIVE
PHENCYCLIDINE SCREEN URINE: NEGATIVE
PROPOXYPHENE SCREEN, URINE: NEGATIVE
SYNTHETIC CANNABINOIDS(K2) SCREEN, URINE: NEGATIVE
THC SCREEN, URINE: NEGATIVE
TRAMADOL SCREEN URINE: NEGATIVE
TRICYCLIC ANTIDEPRESSANTS, UR: NEGATIVE

## 2021-07-22 PROCEDURE — 80305 DRUG TEST PRSMV DIR OPT OBS: CPT | Performed by: NURSE PRACTITIONER

## 2021-07-22 PROCEDURE — 99213 OFFICE O/P EST LOW 20 MIN: CPT | Performed by: NURSE PRACTITIONER

## 2021-07-22 RX ORDER — ALPRAZOLAM 0.25 MG/1
0.25 TABLET ORAL 3 TIMES DAILY PRN
Qty: 42 TABLET | Refills: 0 | Status: SHIPPED | OUTPATIENT
Start: 2021-07-22 | End: 2021-08-05

## 2021-07-22 RX ORDER — MIRTAZAPINE 7.5 MG/1
7.5 TABLET, FILM COATED ORAL NIGHTLY
Qty: 30 TABLET | Refills: 1 | Status: SHIPPED | OUTPATIENT
Start: 2021-07-22 | End: 2021-09-02

## 2021-07-22 ASSESSMENT — ENCOUNTER SYMPTOMS
BACK PAIN: 0
COUGH: 0
DIARRHEA: 0
SHORTNESS OF BREATH: 0
CONSTIPATION: 0

## 2021-07-22 NOTE — PATIENT INSTRUCTIONS
[address] about a counselor specifically for anxiety.   Start Remeron  (Mirtazapine) 7.5 mmg nightly  Xanax (alprozolam) 0.25 mg up to 3x/day for anxiety  No alcohol

## 2021-07-22 NOTE — PROGRESS NOTES
Grandmother     Cancer Other         G. Mother , Cervix    Alcohol Abuse Father         G. Parents, Jossy Ibarra. Social History     Tobacco Use    Smoking status: Never Smoker    Smokeless tobacco: Never Used   Substance Use Topics    Alcohol use: Yes     Comment: Pt states he last drank on 5/30/2021      Current Outpatient Medications   Medication Sig Dispense Refill    ALPRAZolam (XANAX) 0.25 MG tablet Take 1 tablet by mouth 3 times daily as needed for Anxiety for up to 14 days. 42 tablet 0    mirtazapine (REMERON) 7.5 MG tablet Take 1 tablet by mouth nightly 30 tablet 1    metoprolol tartrate (LOPRESSOR) 25 MG tablet Take 0.5 tablets by mouth 2 times daily 60 tablet 0    ibuprofen (ADVIL;MOTRIN) 600 MG tablet Take 1 tablet by mouth 3 times daily as needed for Pain 30 tablet 0    magnesium oxide (MAG-OX) 400 (241.3 Mg) MG TABS tablet Take 1 tablet by mouth 2 times daily 60 tablet 3    Potassium 99 MG TABS Take 99 mg by mouth as needed       cetirizine (ZYRTEC) 10 MG tablet Take 10 mg by mouth daily      albuterol sulfate HFA (PROAIR HFA) 108 (90 Base) MCG/ACT inhaler Inhale 2 puffs into the lungs every 6 hours as needed for Wheezing or Shortness of Breath 1 Inhaler 3    folic acid (FOLVITE) 1 MG tablet Take 1 tablet by mouth daily (Patient not taking: Reported on 7/22/2021) 30 tablet 3     No current facility-administered medications for this visit.      No Known Allergies    Health Maintenance   Topic Date Due    Hepatitis C screen  Never done    Varicella vaccine (1 of 2 - 2-dose childhood series) Never done    Pneumococcal 0-64 years Vaccine (1 of 2 - PPSV23) Never done    COVID-19 Vaccine (1) Never done    HIV screen  Never done    DTaP/Tdap/Td vaccine (1 - Tdap) Never done    Flu vaccine (1) 09/01/2021    Potassium monitoring  07/05/2022    Creatinine monitoring  07/05/2022    Hepatitis A vaccine  Aged Out    Hepatitis B vaccine  Aged Out    Hib vaccine  Aged Out    Meningococcal (ACWY) vaccine  Aged Out       Subjective:      Review of Systems   Constitutional: Negative for chills, fatigue and fever. HENT: Negative for ear pain and nosebleeds. Respiratory: Negative for cough and shortness of breath. Cardiovascular: Negative for chest pain and palpitations. Gastrointestinal: Negative for constipation and diarrhea. Genitourinary: Negative for difficulty urinating and dysuria. Musculoskeletal: Positive for myalgias. Negative for back pain. Skin: Negative for rash and wound. Psychiatric/Behavioral: Positive for sleep disturbance. Negative for dysphoric mood. The patient is nervous/anxious. Objective:     BP (!) 122/98   Pulse 87   Wt 184 lb (83.5 kg)   SpO2 98%   BMI 23.00 kg/m²   Physical Exam  Vitals and nursing note reviewed. Constitutional:       Appearance: Normal appearance. HENT:      Head: Normocephalic and atraumatic. Cardiovascular:      Rate and Rhythm: Normal rate and regular rhythm. Heart sounds: Normal heart sounds. Pulmonary:      Effort: Pulmonary effort is normal.      Breath sounds: Normal breath sounds. Abdominal:      General: Bowel sounds are normal.      Palpations: Abdomen is soft. Musculoskeletal:      Right lower leg: No edema. Left lower leg: No edema. Skin:     General: Skin is warm and dry. Neurological:      Mental Status: He is alert and oriented to person, place, and time. Motor: No weakness. Gait: Gait normal.   Psychiatric:         Mood and Affect: Mood is anxious.          Speech: Speech normal.         Cognition and Memory: Cognition normal.     Controlled substances monitoring: possible medication side effects, risk of tolerance and/or dependence, and alternative treatments discussed, no signs of potential drug abuse or diversion identified, OARRS report reviewed today- activity consistent with treatment plan, medication contract signed today and random urine drug screen sent today.    Assessment/Plan:   1. Medication management  -     ALPRAZolam (XANAX) 0.25 MG tablet; Take 1 tablet by mouth 3 times daily as needed for Anxiety for up to 14 days. , Disp-42 tablet, R-0Normal  -     mirtazapine (REMERON) 7.5 MG tablet; Take 1 tablet by mouth nightly, Disp-30 tablet, R-1Normal  2. Anxiety  -     ALPRAZolam (XANAX) 0.25 MG tablet; Take 1 tablet by mouth 3 times daily as needed for Anxiety for up to 14 days. , Disp-42 tablet, R-0Normal  -     mirtazapine (REMERON) 7.5 MG tablet; Take 1 tablet by mouth nightly, Disp-30 tablet, R-1Normal  3. Poor appetite  -     mirtazapine (REMERON) 7.5 MG tablet; Take 1 tablet by mouth nightly, Disp-30 tablet, R-1Normal  4. High risk medication use  -     POCT Rapid Drug Screen  -     Urine Drug Screen; Future       Contact [address] about a counselor specifically for anxiety. Also ask to see psychiatrist  for med management of anxiety. Start Remeron  (Mirtazapine) 7.5 mg nightly  Xanax (alprazolam) 0.25 mg up to 3x/day for anxiety  No alcohol  POCT drug screen shows benzodiazepine - patient states he has not taken any lorazepam for over a month and denies taking any street drug or anything he does not know what it is. Will send urine out to [facility] to be tested. Return in about 2 weeks (around 8/5/2021) for anxiety and driving. Orders Placed This Encounter   Procedures    Urine Drug Screen     Standing Status:   Future     Standing Expiration Date:   7/22/2022    POCT Rapid Drug Screen     Orders Placed This Encounter   Medications    ALPRAZolam (XANAX) 0.25 MG tablet     Sig: Take 1 tablet by mouth 3 times daily as needed for Anxiety for up to 14 days. Dispense:  42 tablet     Refill:  0    mirtazapine (REMERON) 7.5 MG tablet     Sig: Take 1 tablet by mouth nightly     Dispense:  30 tablet     Refill:  1       Patient given educational materials - see patient instructions.   Discussed use, benefit, and side effects of prescribed medications. All patient questions answered. Pt voiced understanding. Reviewed health maintenance. Instructed to continue current medications, diet and exercise. Patient agreed with treatment plan. Follow up as directed.      Electronically signed by AAMIR Solis CNP on 7/22/2021 at 12:13 PM

## 2021-07-29 DIAGNOSIS — R10.12 LUQ PAIN: ICD-10-CM

## 2021-07-29 DIAGNOSIS — F10.10 ALCOHOL ABUSE: ICD-10-CM

## 2021-07-29 DIAGNOSIS — R74.8 ELEVATED LIVER ENZYMES: ICD-10-CM

## 2021-08-02 DIAGNOSIS — Z79.899 HIGH RISK MEDICATION USE: ICD-10-CM

## 2021-09-02 ENCOUNTER — OFFICE VISIT (OUTPATIENT)
Dept: PRIMARY CARE CLINIC | Age: 41
End: 2021-09-02

## 2021-09-02 VITALS
OXYGEN SATURATION: 98 % | WEIGHT: 199.4 LBS | HEART RATE: 64 BPM | SYSTOLIC BLOOD PRESSURE: 122 MMHG | BODY MASS INDEX: 24.92 KG/M2 | DIASTOLIC BLOOD PRESSURE: 82 MMHG

## 2021-09-02 DIAGNOSIS — R74.8 ELEVATED LIVER ENZYMES: ICD-10-CM

## 2021-09-02 DIAGNOSIS — F10.10 ALCOHOL ABUSE: ICD-10-CM

## 2021-09-02 DIAGNOSIS — F41.9 ANXIETY: Primary | ICD-10-CM

## 2021-09-02 DIAGNOSIS — Z79.899 MEDICATION MANAGEMENT: ICD-10-CM

## 2021-09-02 PROCEDURE — 99213 OFFICE O/P EST LOW 20 MIN: CPT | Performed by: NURSE PRACTITIONER

## 2021-09-02 PROCEDURE — 1036F TOBACCO NON-USER: CPT | Performed by: NURSE PRACTITIONER

## 2021-09-02 PROCEDURE — G8427 DOCREV CUR MEDS BY ELIG CLIN: HCPCS | Performed by: NURSE PRACTITIONER

## 2021-09-02 PROCEDURE — G8420 CALC BMI NORM PARAMETERS: HCPCS | Performed by: NURSE PRACTITIONER

## 2021-09-02 RX ORDER — BUSPIRONE HYDROCHLORIDE 5 MG/1
5 TABLET ORAL 2 TIMES DAILY
Qty: 60 TABLET | Refills: 0 | Status: SHIPPED | OUTPATIENT
Start: 2021-09-02 | End: 2021-09-16 | Stop reason: SDDI

## 2021-09-02 ASSESSMENT — ENCOUNTER SYMPTOMS
CONSTIPATION: 0
NAUSEA: 0
SINUS PRESSURE: 0
DIARRHEA: 0
SHORTNESS OF BREATH: 0
COUGH: 0
BACK PAIN: 0

## 2021-09-02 NOTE — PROGRESS NOTES
[facility] PRIMARY CARE  [address] [facility] [address]  Dept: Pr-2 George Narayan is a 47 y.o. male Established patient, who presents today for his medical conditions/complaints as noted below. Chief Complaint   Patient presents with    Anxiety     pt doesn't feel improvement - medication causes drowsiness.  Other     patient would like to be cleared to drive today.  Other     pt denies flu shot today. HPI:     HPI  Sates xanax just made him tired. He did try it for two weeks. He had to serve some time in snf and now he is done with that. He did not try the mirtazapine. Anxiety remains high - he runs about 3 miles every morning. When he was in snf he did push ups. He has been forcing himself to eat. He talked to a therapist   He is suppose to get back in touch with therapist at [facility]. [facility] has a specific counselor for anxiety. He was able to take blood pressure medication and magnesium while in snf. At home he goes off by himself. He does not know what causes anxiety. No alcohol since he has been out of snf. States he has not had any in over 2 months. He has a bracelet on to monitor alcohol intake. It is called a scram ankle bracelet to monitor alcohol intake.     No seizure  Since night of drinking on 6/21/21    Reviewed prior notes None  Reviewed previous Labs7/22/21, 7/16/21    LDL Cholesterol (mg/dL)   Date Value   09/12/2014 111     LDL Calculated (mg/dL)   Date Value   04/09/2018 195 (A)       (goal LDL is <100)   AST (U/L)   Date Value   07/05/2021 288 (H)     ALT (U/L)   Date Value   07/05/2021 104 (H)     BUN (mg/dL)   Date Value   07/05/2021 11     TSH (mIU/L)   Date Value   06/22/2021 1.76     BP Readings from Last 3 Encounters:   09/02/21 122/82   07/22/21 (!) 122/98   07/08/21 122/86          (goal 120/80)    Past Medical History:   Diagnosis Date    Asthma     Essential hypertension 4/2/2018    High cholesterol 6/23/2018    Seasonal allergies       Past Surgical History:   Procedure Laterality Date    TRANSESOPHAGEAL ECHOCARDIOGRAM  9/13/14       Family History   Problem Relation Age of Onset    Stroke Maternal Grandmother         cousin    High Blood Pressure Maternal Grandmother     Cancer Other         G. Mother , Cervix    Alcohol Abuse Father         G. Parents, Ebony Tejada. Social History     Tobacco Use    Smoking status: Never Smoker    Smokeless tobacco: Never Used   Substance Use Topics    Alcohol use: Yes     Comment: Pt states he last drank on 5/30/2021      Current Outpatient Medications   Medication Sig Dispense Refill    busPIRone (BUSPAR) 5 MG tablet Take 1 tablet by mouth 2 times daily 60 tablet 0    metoprolol tartrate (LOPRESSOR) 25 MG tablet TAKE 1/2 TABLET BY MOUTH TWO TIMES A DAY  60 tablet 0    ibuprofen (ADVIL;MOTRIN) 600 MG tablet Take 1 tablet by mouth 3 times daily as needed for Pain 30 tablet 0    magnesium oxide (MAG-OX) 400 (241.3 Mg) MG TABS tablet Take 1 tablet by mouth 2 times daily 60 tablet 3    Potassium 99 MG TABS Take 99 mg by mouth as needed       cetirizine (ZYRTEC) 10 MG tablet Take 10 mg by mouth daily      albuterol sulfate HFA (PROAIR HFA) 108 (90 Base) MCG/ACT inhaler Inhale 2 puffs into the lungs every 6 hours as needed for Wheezing or Shortness of Breath 1 Inhaler 3     No current facility-administered medications for this visit.      No Known Allergies    Health Maintenance   Topic Date Due    Hepatitis C screen  Never done    Varicella vaccine (1 of 2 - 2-dose childhood series) Never done    Pneumococcal 0-64 years Vaccine (1 of 2 - PPSV23) Never done    COVID-19 Vaccine (1) Never done    HIV screen  Never done    DTaP/Tdap/Td vaccine (1 - Tdap) Never done    Flu vaccine (1) Never done    Potassium monitoring  07/16/2022    Creatinine monitoring  07/16/2022    Hepatitis A vaccine  Aged Out    Hepatitis B vaccine  Aged Out    Hib vaccine  Aged Out    Meningococcal (ACWY) vaccine  Aged Out       Subjective:      Review of Systems   Constitutional: Negative for appetite change, chills, fatigue and fever. HENT: Negative for congestion, nosebleeds and sinus pressure. Respiratory: Negative for cough and shortness of breath. Cardiovascular: Negative for chest pain and palpitations. Gastrointestinal: Negative for constipation, diarrhea and nausea. Endocrine: Negative for polydipsia, polyphagia and polyuria. Genitourinary: Negative for difficulty urinating and dysuria. Musculoskeletal: Negative for arthralgias, back pain and gait problem. Skin: Negative for rash and wound. Neurological: Negative for dizziness, light-headedness and headaches. Psychiatric/Behavioral: Positive for sleep disturbance. Negative for dysphoric mood. The patient is nervous/anxious. Objective:     /82   Pulse 64   Wt 199 lb 6.4 oz (90.4 kg)   SpO2 98%   BMI 24.92 kg/m²   Physical Exam  Vitals and nursing note reviewed. Constitutional:       General: He is not in acute distress. Appearance: He is well-developed. He is not ill-appearing. HENT:      Head: Normocephalic and atraumatic. Right Ear: External ear normal.      Left Ear: External ear normal.   Eyes:      General: No scleral icterus. Right eye: No discharge. Left eye: No discharge. Conjunctiva/sclera: Conjunctivae normal.      Pupils: Pupils are equal, round, and reactive to light. Neck:      Thyroid: No thyromegaly. Trachea: No tracheal deviation. Cardiovascular:      Rate and Rhythm: Normal rate and regular rhythm. Heart sounds: Normal heart sounds. Pulmonary:      Effort: Pulmonary effort is normal. No respiratory distress. Breath sounds: Normal breath sounds. No wheezing. Abdominal:      General: Bowel sounds are normal.      Palpations: Abdomen is soft.    Lymphadenopathy:      Cervical: No cervical adenopathy. Skin:     General: Skin is warm. Findings: No rash. Neurological:      Mental Status: He is alert and oriented to person, place, and time. Psychiatric:         Mood and Affect: Mood normal.         Behavior: Behavior normal.         Thought Content: Thought content normal.         Assessment/Plan:   1. Anxiety  -     busPIRone (BUSPAR) 5 MG tablet; Take 1 tablet by mouth 2 times daily, Disp-60 tablet, R-0Normal  2. Medication management  -     busPIRone (BUSPAR) 5 MG tablet; Take 1 tablet by mouth 2 times daily, Disp-60 tablet, R-0Normal  3. Elevated liver enzymes  -     Hepatic Function Panel; Future  4. Alcohol abuse     Contact [facility] [address] about a counselor specifically for anxiety. Also ask to see psychiatrist  for med management of anxiety. Buspirone 5 mg 2x/day as needed for anxiety  Continue exercise to help with anxiety in moderation. Complete blood work. Return in about 2 months (around 11/2/2021) for anxiety. Orders Placed This Encounter   Procedures    Hepatic Function Panel     Standing Status:   Future     Standing Expiration Date:   9/2/2022     Orders Placed This Encounter   Medications    busPIRone (BUSPAR) 5 MG tablet     Sig: Take 1 tablet by mouth 2 times daily     Dispense:  60 tablet     Refill:  0       Patient given educational materials - see patient instructions. Discussed use, benefit, and side effects of prescribed medications. All patient questions answered. Pt voiced understanding. Reviewed health maintenance. Instructed to continue current medications, diet and exercise. Patient agreed with treatment plan. Follow up as directed.      Electronically signed by AAMIR Deluna CNP on 9/2/2021 at 4:20 PM

## 2021-09-02 NOTE — PATIENT INSTRUCTIONS
[address] about a counselor specifically for anxiety. Also ask to see psychiatrist  for med management of anxiety. Buspirone 5 mg 2x/day as needed for anxiety  Continue exercise to help with anxiety in moderation. Complete blood work.

## 2021-09-02 NOTE — LETTER
[facility] Primary [facility]  [address] [place] [address]  Phone: [phone]  Fax: [id number], APRN - CNP        September 2, 2021     Patient: Michael Muhammad   YOB: 1980   Date of Visit: 9/2/2021       To Whom it May Concern:    Tyrese Nelson was seen in my clinic on 9/2/2021. He has been counseled that if he abstains from alcohol he may return to driving safely. If you have any questions or concerns, please don't hesitate to call.     Sincerely,         Heladio Dejesus, APRN - CNP

## 2021-09-14 ENCOUNTER — TELEPHONE (OUTPATIENT)
Dept: PRIMARY CARE CLINIC | Age: 41
End: 2021-09-14
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
## 2021-09-14 NOTE — TELEPHONE ENCOUNTER
I called [phone] again. Again there was no answer. Again I left a message stating that I called. I will attempt some time again tomorrow.

## 2021-09-14 NOTE — TELEPHONE ENCOUNTER
I called and there was no answer, so I left a message saying I would call back at the end of the day.

## 2021-09-14 NOTE — TELEPHONE ENCOUNTER
I called home phone and no answer and left message that I was returning her call. I called mobile phone and was not able to leave message. I will try again later.

## 2021-09-15 NOTE — TELEPHONE ENCOUNTER
I called wife's number again, again there was no answer. I did leave a message stating that perhaps it would be best if Tyrese made a follow-up appointment to discuss his anxiety and his progress with either Dr. Pawan Dawson or myself. I will try her number one more time today.
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
## 2022-01-14 ENCOUNTER — NURSE ONLY (OUTPATIENT)
Dept: PRIMARY CARE CLINIC | Age: 42
End: 2022-01-14

## 2022-01-14 ENCOUNTER — HOSPITAL ENCOUNTER (OUTPATIENT)
Age: 42
Setting detail: SPECIMEN
Discharge: HOME | End: 2022-01-14

## 2022-01-14 DIAGNOSIS — Z11.52 ENCOUNTER FOR SCREENING FOR COVID-19: Primary | ICD-10-CM

## 2022-01-14 NOTE — PROGRESS NOTES
Patient seen with [facility] for [facility] for COVID Swab. Patient said he has no sx but his employer is making him get one since he is not vaccinated. Patient was swabbed and tolerated it well.

## 2022-01-18 ENCOUNTER — TELEPHONE (OUTPATIENT)
Dept: PRIMARY CARE CLINIC | Age: 42
End: 2022-01-18

## 2022-01-18 LAB — SARS-COV-2, NAA: NOT DETECTED

## 2022-01-18 NOTE — TELEPHONE ENCOUNTER
Pt came into the office asking for covid results. Pt was notified that the results were not in yet and that we would call him when we get them.

## 2022-01-18 NOTE — TELEPHONE ENCOUNTER
----- Message from Steven Henry sent at 1/18/2022  2:51 PM EST -----  Subject: Results Request    QUESTIONS  Which lab or imaging result is the patient calling about? Covid   Which provider ordered the test? Lydia Door   At what location was the test performed? Date the test was performed? 2022-01-14  Additional Information for Provider? patient needs results and   documentation of covid test results   ---------------------------------------------------------------------------  --------------  CALL BACK INFO  What is the best way for the office to contact you? OK to leave message on   voicemail  Preferred Call Back Phone Number?  [phone]

## 2022-08-03 DIAGNOSIS — I10 ESSENTIAL HYPERTENSION: ICD-10-CM

## 2022-10-03 ENCOUNTER — APPOINTMENT (OUTPATIENT)
Dept: GENERAL RADIOLOGY | Age: 42
End: 2022-10-03
Payer: COMMERCIAL

## 2022-10-03 ENCOUNTER — HOSPITAL ENCOUNTER (EMERGENCY)
Age: 42
Discharge: HOME | End: 2022-10-04
Attending: EMERGENCY MEDICINE
Payer: COMMERCIAL

## 2022-10-03 DIAGNOSIS — M79.89 LEG SWELLING: Primary | ICD-10-CM

## 2022-10-03 LAB
ABSOLUTE EOS #: 0.1 K/UL (ref 0–0.4)
ABSOLUTE LYMPH #: 1 K/UL (ref 1–4.8)
ABSOLUTE MONO #: 0.4 K/UL (ref 0.1–1.3)
BASOPHILS # BLD: 1 % (ref 0–2)
BASOPHILS ABSOLUTE: 0 K/UL (ref 0–0.2)
EOSINOPHILS RELATIVE PERCENT: 2 % (ref 0–4)
HCT VFR BLD CALC: 35.9 % (ref 41–53)
HEMOGLOBIN: 11.7 G/DL (ref 13.5–17.5)
LYMPHOCYTES # BLD: 28 % (ref 24–44)
MCH RBC QN AUTO: 26.4 PG (ref 26–34)
MCHC RBC AUTO-ENTMCNC: 32.5 G/DL (ref 31–37)
MCV RBC AUTO: 81.4 FL (ref 80–100)
MONOCYTES # BLD: 12 % (ref 1–7)
PDW BLD-RTO: 15.7 % (ref 11.5–14.9)
PLATELET # BLD: 125 K/UL (ref 150–450)
PMV BLD AUTO: 7.9 FL (ref 6–12)
RBC # BLD: 4.41 M/UL (ref 4.5–5.9)
SEG NEUTROPHILS: 57 % (ref 36–66)
SEGMENTED NEUTROPHILS ABSOLUTE COUNT: 2.1 K/UL (ref 1.3–9.1)
WBC # BLD: 3.7 K/UL (ref 3.5–11)

## 2022-10-03 PROCEDURE — 85652 RBC SED RATE AUTOMATED: CPT

## 2022-10-03 PROCEDURE — 99284 EMERGENCY DEPT VISIT MOD MDM: CPT

## 2022-10-03 PROCEDURE — 85025 COMPLETE CBC W/AUTO DIFF WBC: CPT

## 2022-10-03 PROCEDURE — 96374 THER/PROPH/DIAG INJ IV PUSH: CPT

## 2022-10-03 PROCEDURE — 80048 BASIC METABOLIC PNL TOTAL CA: CPT

## 2022-10-03 PROCEDURE — 82550 ASSAY OF CK (CPK): CPT

## 2022-10-03 PROCEDURE — 6360000002 HC RX W HCPCS: Performed by: STUDENT IN AN ORGANIZED HEALTH CARE EDUCATION/TRAINING PROGRAM

## 2022-10-03 PROCEDURE — 73630 X-RAY EXAM OF FOOT: CPT

## 2022-10-03 PROCEDURE — 36415 COLL VENOUS BLD VENIPUNCTURE: CPT

## 2022-10-03 PROCEDURE — 86140 C-REACTIVE PROTEIN: CPT

## 2022-10-03 PROCEDURE — 73560 X-RAY EXAM OF KNEE 1 OR 2: CPT

## 2022-10-03 PROCEDURE — 85379 FIBRIN DEGRADATION QUANT: CPT

## 2022-10-03 PROCEDURE — 73590 X-RAY EXAM OF LOWER LEG: CPT

## 2022-10-03 PROCEDURE — 96372 THER/PROPH/DIAG INJ SC/IM: CPT

## 2022-10-03 RX ORDER — KETOROLAC TROMETHAMINE 30 MG/ML
30 INJECTION, SOLUTION INTRAMUSCULAR; INTRAVENOUS ONCE
Status: COMPLETED | OUTPATIENT
Start: 2022-10-03 | End: 2022-10-03

## 2022-10-03 RX ADMIN — KETOROLAC TROMETHAMINE 30 MG: 30 INJECTION, SOLUTION INTRAMUSCULAR at 23:43

## 2022-10-03 ASSESSMENT — PAIN SCALES - GENERAL: PAINLEVEL_OUTOF10: 10

## 2022-10-03 ASSESSMENT — PAIN - FUNCTIONAL ASSESSMENT: PAIN_FUNCTIONAL_ASSESSMENT: 0-10

## 2022-10-03 ASSESSMENT — PAIN DESCRIPTION - ORIENTATION: ORIENTATION: LEFT

## 2022-10-03 ASSESSMENT — PAIN DESCRIPTION - LOCATION: LOCATION: LEG;KNEE

## 2022-10-03 ASSESSMENT — PAIN DESCRIPTION - DESCRIPTORS: DESCRIPTORS: TINGLING;THROBBING

## 2022-10-03 NOTE — Clinical Note
Tyrese Nelson was seen and treated in our emergency department on 10/3/2022. He may return to work on 10/14/2022. If you have any questions or concerns, please don't hesitate to call.       Guillermo Savage, DO

## 2022-10-04 ENCOUNTER — APPOINTMENT (OUTPATIENT)
Dept: VASCULAR LAB | Age: 42
End: 2022-10-04
Payer: COMMERCIAL

## 2022-10-04 VITALS
WEIGHT: 180 LBS | BODY MASS INDEX: 22.5 KG/M2 | SYSTOLIC BLOOD PRESSURE: 160 MMHG | TEMPERATURE: 98.5 F | DIASTOLIC BLOOD PRESSURE: 97 MMHG | HEART RATE: 65 BPM | RESPIRATION RATE: 14 BRPM | OXYGEN SATURATION: 96 %

## 2022-10-04 VITALS
SYSTOLIC BLOOD PRESSURE: 129 MMHG | OXYGEN SATURATION: 98 % | WEIGHT: 175 LBS | TEMPERATURE: 98.6 F | DIASTOLIC BLOOD PRESSURE: 87 MMHG | BODY MASS INDEX: 21.76 KG/M2 | RESPIRATION RATE: 17 BRPM | HEART RATE: 71 BPM | HEIGHT: 75 IN

## 2022-10-04 DIAGNOSIS — I82.402 ACUTE DEEP VEIN THROMBOSIS (DVT) OF LEFT LOWER EXTREMITY, UNSPECIFIED VEIN (HCC): Primary | ICD-10-CM

## 2022-10-04 LAB
ABSOLUTE BANDS #: 0.03 K/UL (ref 0–1)
ABSOLUTE EOS #: 0.09 K/UL (ref 0–0.4)
ABSOLUTE LYMPH #: 0.87 K/UL (ref 1–4.8)
ABSOLUTE MONO #: 0.49 K/UL (ref 0.1–1.3)
ANION GAP SERPL CALCULATED.3IONS-SCNC: 11 MMOL/L (ref 9–17)
ANION GAP SERPL CALCULATED.3IONS-SCNC: 9 MMOL/L (ref 9–17)
BANDS: 1 % (ref 0–10)
BASOPHILS # BLD: 0 % (ref 0–2)
BASOPHILS ABSOLUTE: 0 K/UL (ref 0–0.2)
BUN BLDV-MCNC: 12 MG/DL (ref 6–20)
BUN BLDV-MCNC: 19 MG/DL (ref 6–20)
C-REACTIVE PROTEIN: <3 MG/L (ref 0–5)
CALCIUM SERPL-MCNC: 9.4 MG/DL (ref 8.6–10.4)
CALCIUM SERPL-MCNC: 9.6 MG/DL (ref 8.6–10.4)
CHLORIDE BLD-SCNC: 100 MMOL/L (ref 98–107)
CHLORIDE BLD-SCNC: 100 MMOL/L (ref 98–107)
CO2: 27 MMOL/L (ref 20–31)
CO2: 30 MMOL/L (ref 20–31)
CREAT SERPL-MCNC: 0.81 MG/DL (ref 0.7–1.2)
CREAT SERPL-MCNC: 1.1 MG/DL (ref 0.7–1.2)
D-DIMER QUANTITATIVE: 0.68 MG/L FEU (ref 0–0.59)
EOSINOPHILS RELATIVE PERCENT: 3 % (ref 0–4)
GFR SERPL CREATININE-BSD FRML MDRD: >60 ML/MIN/1.73M2
GFR SERPL CREATININE-BSD FRML MDRD: >60 ML/MIN/1.73M2
GLUCOSE BLD-MCNC: 125 MG/DL (ref 70–99)
GLUCOSE BLD-MCNC: 98 MG/DL (ref 70–99)
HCT VFR BLD CALC: 37.3 % (ref 41–53)
HEMOGLOBIN: 12.2 G/DL (ref 13.5–17.5)
LYMPHOCYTES # BLD: 30 % (ref 24–44)
MAGNESIUM: 1.7 MG/DL (ref 1.6–2.6)
MCH RBC QN AUTO: 26.7 PG (ref 26–34)
MCHC RBC AUTO-ENTMCNC: 32.6 G/DL (ref 31–37)
MCV RBC AUTO: 82 FL (ref 80–100)
MONOCYTES # BLD: 17 % (ref 1–7)
MORPHOLOGY: ABNORMAL
PDW BLD-RTO: 15.6 % (ref 11.5–14.9)
PLATELET # BLD: 127 K/UL (ref 150–450)
PMV BLD AUTO: 7.7 FL (ref 6–12)
POTASSIUM SERPL-SCNC: 4 MMOL/L (ref 3.7–5.3)
POTASSIUM SERPL-SCNC: 4.3 MMOL/L (ref 3.7–5.3)
RBC # BLD: 4.55 M/UL (ref 4.5–5.9)
SEDIMENTATION RATE, ERYTHROCYTE: 6 MM/HR (ref 0–15)
SEG NEUTROPHILS: 49 % (ref 36–66)
SEGMENTED NEUTROPHILS ABSOLUTE COUNT: 1.42 K/UL (ref 1.3–9.1)
SODIUM BLD-SCNC: 138 MMOL/L (ref 135–144)
SODIUM BLD-SCNC: 139 MMOL/L (ref 135–144)
TOTAL CK: 377 U/L (ref 39–308)
TOTAL CK: 503 U/L (ref 39–308)
WBC # BLD: 2.9 K/UL (ref 3.5–11)

## 2022-10-04 PROCEDURE — 36415 COLL VENOUS BLD VENIPUNCTURE: CPT

## 2022-10-04 PROCEDURE — 82550 ASSAY OF CK (CPK): CPT

## 2022-10-04 PROCEDURE — 6360000002 HC RX W HCPCS: Performed by: STUDENT IN AN ORGANIZED HEALTH CARE EDUCATION/TRAINING PROGRAM

## 2022-10-04 PROCEDURE — 99284 EMERGENCY DEPT VISIT MOD MDM: CPT

## 2022-10-04 PROCEDURE — 80048 BASIC METABOLIC PNL TOTAL CA: CPT

## 2022-10-04 PROCEDURE — 83735 ASSAY OF MAGNESIUM: CPT

## 2022-10-04 PROCEDURE — 6370000000 HC RX 637 (ALT 250 FOR IP): Performed by: STUDENT IN AN ORGANIZED HEALTH CARE EDUCATION/TRAINING PROGRAM

## 2022-10-04 PROCEDURE — 6370000000 HC RX 637 (ALT 250 FOR IP): Performed by: EMERGENCY MEDICINE

## 2022-10-04 PROCEDURE — 85025 COMPLETE CBC W/AUTO DIFF WBC: CPT

## 2022-10-04 PROCEDURE — 93971 EXTREMITY STUDY: CPT

## 2022-10-04 RX ORDER — OXYCODONE HYDROCHLORIDE AND ACETAMINOPHEN 5; 325 MG/1; MG/1
1 TABLET ORAL ONCE
Status: COMPLETED | OUTPATIENT
Start: 2022-10-04 | End: 2022-10-04

## 2022-10-04 RX ORDER — OXYCODONE HYDROCHLORIDE AND ACETAMINOPHEN 5; 325 MG/1; MG/1
1 TABLET ORAL EVERY 6 HOURS PRN
Qty: 12 TABLET | Refills: 0 | Status: SHIPPED | OUTPATIENT
Start: 2022-10-04 | End: 2022-10-07

## 2022-10-04 RX ORDER — IBUPROFEN 800 MG/1
800 TABLET ORAL EVERY 8 HOURS PRN
Qty: 6 TABLET | Refills: 0 | Status: SHIPPED | OUTPATIENT
Start: 2022-10-04 | End: 2022-10-17

## 2022-10-04 RX ORDER — ACETAMINOPHEN 500 MG
1000 TABLET ORAL EVERY 8 HOURS PRN
Qty: 12 TABLET | Refills: 0 | Status: SHIPPED | OUTPATIENT
Start: 2022-10-04 | End: 2022-10-17

## 2022-10-04 RX ORDER — ENOXAPARIN SODIUM 100 MG/ML
1 INJECTION SUBCUTANEOUS ONCE
Status: COMPLETED | OUTPATIENT
Start: 2022-10-04 | End: 2022-10-04

## 2022-10-04 RX ADMIN — OXYCODONE AND ACETAMINOPHEN 1 TABLET: 5; 325 TABLET ORAL at 01:29

## 2022-10-04 RX ADMIN — ENOXAPARIN SODIUM 80 MG: 100 INJECTION SUBCUTANEOUS at 01:28

## 2022-10-04 RX ADMIN — APIXABAN 10 MG: 5 TABLET, FILM COATED ORAL at 12:04

## 2022-10-04 ASSESSMENT — ENCOUNTER SYMPTOMS
COLOR CHANGE: 0
ABDOMINAL PAIN: 0
NAUSEA: 0
COLOR CHANGE: 0
ABDOMINAL PAIN: 0
EYE PAIN: 0
CONSTIPATION: 0
TROUBLE SWALLOWING: 0
BACK PAIN: 0
SHORTNESS OF BREATH: 0
BACK PAIN: 0
DIARRHEA: 0
COUGH: 0
SHORTNESS OF BREATH: 0
CHEST TIGHTNESS: 0
VOMITING: 0

## 2022-10-04 ASSESSMENT — PAIN SCALES - GENERAL
PAINLEVEL_OUTOF10: 6
PAINLEVEL_OUTOF10: 7

## 2022-10-04 ASSESSMENT — PAIN - FUNCTIONAL ASSESSMENT: PAIN_FUNCTIONAL_ASSESSMENT: 0-10

## 2022-10-04 ASSESSMENT — PAIN DESCRIPTION - LOCATION: LOCATION: KNEE;LEG

## 2022-10-04 ASSESSMENT — PAIN DESCRIPTION - ORIENTATION: ORIENTATION: LEFT

## 2022-10-04 ASSESSMENT — PAIN DESCRIPTION - DESCRIPTORS: DESCRIPTORS: THROBBING;TINGLING

## 2022-10-04 NOTE — ED NOTES
The following labs labeled with pt sticker and tubed to lab:     [] Blue     [x] Lavender   [] on ice  [x] Green/yellow  [] Green/black [] on ice  [] Yellow  [] Red  [] Pink      [] COVID-19 swab    [] Rapid  [] PCR  [] Flu swab  [] Peds Viral Panel     [] Urine Sample  [] Pelvic Cultures  [] Blood Cultures          Anali Garcia RN  10/04/22 1002

## 2022-10-04 NOTE — ED TRIAGE NOTES
Pt arrives to ED c/o left leg pain. Patient was seen in the ED last night and ti was recommended he return today for an ultrasound of his leg. Patient was given Lovenox last night before being discharged. Patient states that the swelling in his leg has gotten worse. Patient is ambulatory with crutches.

## 2022-10-04 NOTE — ED PROVIDER NOTES
EMERGENCY DEPARTMENT ENCOUNTER    Pt Name: Kelly Mishra  MRN: [id number]  [place] 1980  Date of evaluation: 10/4/22  CHIEF COMPLAINT       Chief Complaint   Patient presents with    Leg Pain     HISTORY OF PRESENT ILLNESS   22-year-old male presents for complaints of left leg pain. 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 Denies any new numbness tingling or weakness to the extremity, patient was seen in ED yesterday for same complaint had x-rays and labs performed, returning today to get ultrasound performed. The history is provided by the patient. REVIEW OF SYSTEMS     Review of Systems   Constitutional:  Negative for chills and fever. HENT:  Negative for congestion and ear pain. Eyes:  Negative for pain. Respiratory:  Negative for shortness of breath. Cardiovascular:  Negative for chest pain, palpitations and leg swelling. Gastrointestinal:  Negative for abdominal pain. Genitourinary:  Negative for dysuria and flank pain. Musculoskeletal:  Negative for back pain. Left leg pain   Skin:  Negative for color change. Neurological:  Negative for numbness and headaches. Psychiatric/Behavioral:  Negative for confusion. All other systems reviewed and are negative.   PASTMEDICAL HISTORY     Past Medical History:   Diagnosis Date    Asthma     Essential hypertension 4/2/2018    High cholesterol 6/23/2018    Seasonal allergies      Past Problem List  Patient Active Problem List   Diagnosis Code    Seasonal allergies J30.2    Mild intermittent asthma without complication K37.61    Essential hypertension I10    History of stroke Z86.73    High cholesterol E78.00    Medically noncompliant Z91.199    Rhabdomyolysis M62.82     SURGICAL HISTORY       Past Surgical History:   Procedure Laterality Date    TRANSESOPHAGEAL ECHOCARDIOGRAM  9/13/14     CURRENT MEDICATIONS       Discharge Medication List as of 10/4/2022 11:38 AM        CONTINUE these medications which have NOT CHANGED    Details   !! ibuprofen (ADVIL;MOTRIN) 800 MG tablet Take 1 tablet by mouth every 8 hours as needed for Pain, Disp-6 tablet, R-0Print      acetaminophen (TYLENOL) 500 MG tablet Take 2 tablets by mouth every 8 hours as needed for Pain, Disp-12 tablet, R-0Print      metoprolol tartrate (LOPRESSOR) 25 MG tablet Take 0.5 tablets by mouth in the morning and 0.5 tablets before bedtime. , Disp-60 tablet, R-1Normal      !! ibuprofen (ADVIL;MOTRIN) 600 MG tablet Take 1 tablet by mouth 3 times daily as needed for Pain, Disp-30 tablet, R-0Print      magnesium oxide (MAG-OX) 400 (241.3 Mg) MG TABS tablet Take 1 tablet by mouth 2 times daily, Disp-60 tablet, R-3Normal      Potassium 99 MG TABS Take 99 mg by mouth as needed Historical Med      cetirizine (ZYRTEC) 10 MG tablet Take 10 mg by mouth dailyHistorical Med      albuterol sulfate HFA (PROAIR HFA) 108 (90 Base) MCG/ACT inhaler Inhale 2 puffs into the lungs every 6 hours as needed for Wheezing or Shortness of Breath, Disp-1 Inhaler, R-3Normal       !! - Potential duplicate medications found. Please discuss with provider. ALLERGIES     has No Known Allergies. FAMILY HISTORY     He indicated that the status of his father is unknown. He indicated that the status of his maternal grandmother is unknown. He indicated that the status of his other is unknown.      SOCIAL HISTORY       Social History     Tobacco Use    Smoking status: Never    Smokeless tobacco: Never   Substance Use Topics    Alcohol use: Yes     Comment: Pt states he last drank on 5/30/2021    Drug use: No     PHYSICAL EXAM     INITIAL VITALS: /87   Pulse 71   Temp 98.6 °F (37 °C) (Oral)   Resp 17   Ht 6' 3\" (1.905 m)   Wt 175 lb (79.4 kg)   SpO2 98%   BMI 21.87 kg/m²    Physical Exam  Vitals and nursing note reviewed. Constitutional:       General: He is not in acute distress. Appearance: Normal appearance. He is not ill-appearing. HENT:      Head: Normocephalic and atraumatic. Right Ear: External ear normal.      Left Ear: External ear normal.      Nose: Nose normal.      Mouth/Throat:      Mouth: Mucous membranes are moist.   Eyes:      Extraocular Movements: Extraocular movements intact. Pupils: Pupils are equal, round, and reactive to light. Cardiovascular:      Rate and Rhythm: Normal rate and regular rhythm. Pulses: Normal pulses. Dorsalis pedis pulses are 2+ on the right side and 2+ on the left side. Heart sounds: Normal heart sounds. Pulmonary:      Effort: Pulmonary effort is normal.      Breath sounds: Normal breath sounds. Abdominal:      General: Abdomen is flat. Palpations: Abdomen is soft. Tenderness: There is no abdominal tenderness. Musculoskeletal:         General: Normal range of motion. Cervical back: Neck supple. No spinous process tenderness or muscular tenderness. Left lower leg: Swelling and tenderness present. Legs:       Comments: Swelling noted to left lower extremity, tenderness in the posterior left knee and left calf, compartments are soft, +2 DP pulses, sensation intact to the feet, no pain with passive extension of the ankle   Skin:     General: Skin is warm and dry. Capillary Refill: Capillary refill takes less than 2 seconds. Neurological:      General: No focal deficit present. Mental Status: He is alert and oriented to person, place, and time. Cranial Nerves: Cranial nerves 2-12 are intact. Sensory: Sensation is intact. Motor: Motor function is intact. Psychiatric:         Behavior: Behavior normal.         Thought Content:  Thought content does not include homicidal or suicidal ideation. MEDICAL DECISION MAKIN-year-old male presents for left leg pain. On initial exam patient in no acute distress, vitals are stable, does not have tenderness in the posterior left leg has noticed some swelling, but words are soft, there is no pain with passive extension, +2 DP pulses, do not suspect compartment syndrome at this time, he was seen last night had labs and imaging performed that were unremarkable, will recheck basic labs, will check Doppler    Labs reviewed, CK was down from last night, labs white count at 2.9 Doppler was found to be positive for DVT    No evidence of compartment syndrome, phlegmasia cerulea dolens, sensation to the lower extremity +2 DP pulses, pain with passive extension of the foot    Will start patient on course of oral anticoagulation    Results were discussed with patient, discussed that we will be starting her on anticoagulation, discussed need for follow-up with PCP and return precautions, patient voiced understanding comfortable with plan and discharge    Patient/Guardian was informed of their diagnosis and told to follow up with PCP  in 1-3 days. Patient demonstrates understanding and agreement with the plan. They were given the opportunity to ask questions and those questions were answered to the best of our ability with the available information. Patient/Guardian told to return to the ED for any new, worsening, changing or persistent symptoms. This dictation was prepared using [employer] voice recognition software.           CRITICAL CARE:       PROCEDURES:    Procedures    DIAGNOSTIC RESULTS   EKG:All EKG's are interpreted by the Emergency Department Physician who either signs or Co-signs this chart in the absence of a cardiologist.        RADIOLOGY:All plain film, CT, MRI, and formal ultrasound images (except ED bedside ultrasound) are read by the radiologist, see reports below, unless otherwisenoted in MDM or here.  VL Lower Extremity Venous Left    (Results Pending)     LABS: All lab results were reviewed by myself, and all abnormals are listed below. Labs Reviewed   BASIC METABOLIC PANEL - Abnormal; Notable for the following components:       Result Value    Glucose 125 (*)     All other components within normal limits   CBC WITH AUTO DIFFERENTIAL - Abnormal; Notable for the following components:    WBC 2.9 (*)     Hemoglobin 12.2 (*)     Hematocrit 37.3 (*)     RDW 15.6 (*)     Platelets 111 (*)     Monocytes 17 (*)     Absolute Lymph # 0.87 (*)     All other components within normal limits   CK - Abnormal; Notable for the following components: Total  (*)     All other components within normal limits   MAGNESIUM       EMERGENCY DEPARTMENTCOURSE:         Vitals:    Vitals:    10/04/22 0937   BP: 129/87   Pulse: 71   Resp: 17   Temp: 98.6 °F (37 °C)   TempSrc: Oral   SpO2: 98%   Weight: 175 lb (79.4 kg)   Height: 6' 3\" (1.905 m)       The patient was given the following medications while in the emergency department:  Orders Placed This Encounter   Medications    apixaban starter pack (ELIQUIS DVT/PE STARTER PACK) 5 MG TBPK tablet     Sig: Take 1 tablet by mouth See Admin Instructions     Dispense:  74 tablet     Refill:  0    apixaban (ELIQUIS) tablet 10 mg     Order Specific Question:   Indication of Use     Answer:   Treatment-DVT/PE    oxyCODONE-acetaminophen (PERCOCET) 5-325 MG per tablet     Sig: Take 1 tablet by mouth every 6 hours as needed for Pain for up to 3 days. Intended supply: 3 days. Take lowest dose possible to manage pain     Dispense:  12 tablet     Refill:  0     CONSULTS:  None    FINAL IMPRESSION      1. Acute deep vein thrombosis (DVT) of left lower extremity, unspecified vein [facility])          DISPOSITION/PLAN   DISPOSITION Decision To Discharge 10/04/2022 11:38:49 AM      PATIENT REFERRED TO:  Pardeep Geiger MD  [address]   [address]  [address] [place] [address]  [phone]    Schedule an appointment as soon as possible for a visit       [facility] ED  Asha Li  [phone]    As needed, If symptoms worsen  DISCHARGE MEDICATIONS:  Discharge Medication List as of 10/4/2022 11:38 AM        START taking these medications    Details   apixaban starter pack (ELIQUIS DVT/PE STARTER PACK) 5 MG TBPK tablet Take 1 tablet by mouth See Admin Instructions, Disp-74 tablet, R-0Print           The care is provided during an unprecedented national emergency due to the novel coronavirus, COVID 19.   Jes Friedman, DO Jes Friedman DO  10/04/22 1236

## 2022-10-04 NOTE — DISCHARGE INSTRUCTIONS
You were given a blood thinner today called Lovenox for a suspected blood clot in your left lower extremity. You need to return to the emergency department first thing tomorrow morning to have an ultrasound done to evaluate for a blood clot.     If your symptoms worsen in any way, swelling increases, pain increases you develop loss of strength or sensation shortness of breath or chest pain return to the emergency department or call 911

## 2022-10-04 NOTE — Clinical Note
Tyrese Nelson was seen and treated in our emergency department on 10/4/2022. He may return to work on 10/10/2022. If you have any questions or concerns, please don't hesitate to call.       [address], DO

## 2022-10-04 NOTE — ED PROVIDER NOTES
[address] ED  Emergency Department Encounter  EmergencyMedicine Resident     Pt Name:Tyrese Garcia  MRN: [id number]  Birthdate 1980  Date of evaluation: 10/3/22  PCP:  Phillip Ding MD    CHIEF COMPLAINT       Chief Complaint   Patient presents with    Knee Pain     Left        HISTORY OF PRESENT ILLNESS  (Location/Symptom, Timing/Onset, Context/Setting, Quality, Duration, Modifying Factors, Severity.)      Diana Jones is a 46 y.o. male who presents with left lower extremity swelling for several days. Patient is not sure why it swollen he denies any injuries or trauma to the area. He states it is painful but he has sensation in the leg. He states he has reduced range of motion and moving his knee due to the pain. He denies any history of blood clots. He does state he has had rhabdomyolysis in the past from working out states he has been working out per his baseline    PAST MEDICAL / SURGICAL / SOCIAL / FAMILY HISTORY      has a past medical history of Asthma, Essential hypertension, High cholesterol, and Seasonal allergies. has a past surgical history that includes transesophageal echocardiogram (9/13/14).       Social History     Socioeconomic History    Marital status:      Spouse name: Not on file    Number of children: Not on file    Years of education: Not on file    Highest education level: Not on file   Occupational History    Not on file   Tobacco Use    Smoking status: Never    Smokeless tobacco: Never   Substance and Sexual Activity    Alcohol use: Yes     Comment: Pt states he last drank on 5/30/2021    Drug use: No    Sexual activity: Not on file   Other Topics Concern    Not on file   Social History Narrative    Not on file     Social Determinants of Health     Financial Resource Strain: Not on file   Food Insecurity: Not on file   Transportation Needs: Not on file   Physical Activity: Not on file   Stress: Not on file   Social Connections: Not on file Intimate Partner Violence: Not on file   Housing Stability: Not on file       Family History   Problem Relation Age of Onset    Stroke Maternal Grandmother         cousin    High Blood Pressure Maternal Grandmother     Cancer Other         G. Mother , Cervix    Alcohol Abuse Father         G. Parents, Uncles. Allergies:  Patient has no known allergies. Home Medications:  Prior to Admission medications    Medication Sig Start Date End Date Taking? Authorizing Provider   ibuprofen (ADVIL;MOTRIN) 800 MG tablet Take 1 tablet by mouth every 8 hours as needed for Pain 10/4/22 10/6/22 Yes Eilleen Gitelman, DO   acetaminophen (TYLENOL) 500 MG tablet Take 2 tablets by mouth every 8 hours as needed for Pain 10/4/22 10/6/22 Yes Eilleen Gitelman, DO   metoprolol tartrate (LOPRESSOR) 25 MG tablet Take 0.5 tablets by mouth in the morning and 0.5 tablets before bedtime. 8/3/22   Nohemi Whaley MD   ibuprofen (ADVIL;MOTRIN) 600 MG tablet Take 1 tablet by mouth 3 times daily as needed for Pain 7/5/21   Lamont Kan MD   magnesium oxide (MAG-OX) 400 (241.3 Mg) MG TABS tablet Take 1 tablet by mouth 2 times daily 6/26/21   Rl Norman MD   Potassium 99 MG TABS Take 99 mg by mouth as needed     Historical Provider, MD   cetirizine (ZYRTEC) 10 MG tablet Take 10 mg by mouth daily    Historical Provider, MD   albuterol sulfate HFA (PROAIR HFA) 108 (90 Base) MCG/ACT inhaler Inhale 2 puffs into the lungs every 6 hours as needed for Wheezing or Shortness of Breath 4/24/20   Nohemi Whaley MD       REVIEW OF SYSTEMS    (2-9 systems for level 4, 10 or more for level 5)      Review of Systems   Constitutional:  Negative for appetite change, fatigue and fever. HENT:  Negative for congestion and trouble swallowing. Respiratory:  Negative for cough, chest tightness and shortness of breath. Cardiovascular:  Negative for chest pain and leg swelling.    Gastrointestinal:  Negative for abdominal pain, constipation, diarrhea, nausea and vomiting. Genitourinary:  Negative for dysuria. Musculoskeletal:  Positive for joint swelling. Negative for back pain. Skin:  Negative for color change and rash. Neurological:  Negative for dizziness, weakness, numbness and headaches. PHYSICAL EXAM   (up to 7 for level 4, 8 or more for level 5)      INITIAL VITALS:   BP (!) 144/103   Pulse 77   Temp 98.5 °F (36.9 °C) (Oral)   Resp 18   Wt 180 lb (81.6 kg)   SpO2 99%   BMI 22.50 kg/m²     Physical Exam  Constitutional:       General: He is not in acute distress. HENT:      Head: Normocephalic and atraumatic. Nose: No congestion. Eyes:      General: No scleral icterus. Pupils: Pupils are equal, round, and reactive to light. Cardiovascular:      Rate and Rhythm: Normal rate. Heart sounds: No murmur heard. No friction rub. No gallop. Pulmonary:      Breath sounds: No wheezing, rhonchi or rales. Abdominal:      General: Abdomen is flat. There is no distension. Palpations: Abdomen is soft. Tenderness: There is no abdominal tenderness. There is no guarding or rebound. Musculoskeletal:         General: Swelling present. Cervical back: Normal range of motion. Comments: Symmetric swelling and tenderness of left lower extremity from the knee down to left ankle. No erythema or warmth. Sensation intact +2 DP/PT pulse   Skin:     Capillary Refill: Capillary refill takes less than 2 seconds. Findings: No rash. Neurological:      General: No focal deficit present. DIFFERENTIAL  DIAGNOSIS     PLAN (LABS / IMAGING / EKG):  Orders Placed This Encounter   Procedures    XR KNEE LEFT (1-2 VIEWS)    XR TIBIA FIBULA LEFT (2 VIEWS)    XR FOOT LEFT (MIN 3 VIEWS)    CBC with Auto Differential    BMP    CK    Myoglobin    D-Dimer, Quantitative    Sedimentation Rate    C-Reactive Protein    [employer] Crutches; Pair, Left Side Injury;  Tall (5'10\"-6'6\") MEDICATIONS ORDERED:  Orders Placed This Encounter   Medications    ketorolac (TORADOL) injection 30 mg    enoxaparin (LOVENOX) injection 80 mg     Order Specific Question:   Indication of Use     Answer:   Treatment-DVT/PE    ibuprofen (ADVIL;MOTRIN) 800 MG tablet     Sig: Take 1 tablet by mouth every 8 hours as needed for Pain     Dispense:  6 tablet     Refill:  0    acetaminophen (TYLENOL) 500 MG tablet     Sig: Take 2 tablets by mouth every 8 hours as needed for Pain     Dispense:  12 tablet     Refill:  0    oxyCODONE-acetaminophen (PERCOCET) 5-325 MG per tablet 1 tablet       DIAGNOSTIC RESULTS / EMERGENCY DEPARTMENT COURSE / MDM   LAB RESULTS:  Results for orders placed or performed during the hospital encounter of 10/03/22   CBC with Auto Differential   Result Value Ref Range    WBC 3.7 3.5 - 11.0 k/uL    RBC 4.41 (L) 4.5 - 5.9 m/uL    Hemoglobin 11.7 (L) 13.5 - 17.5 g/dL    Hematocrit 35.9 (L) 41 - 53 %    MCV 81.4 80 - 100 fL    MCH 26.4 26 - 34 pg    MCHC 32.5 31 - 37 g/dL    RDW 15.7 (H) 11.5 - 14.9 %    Platelets 191 (L) 554 - 450 k/uL    MPV 7.9 6.0 - 12.0 fL    Seg Neutrophils 57 36 - 66 %    Lymphocytes 28 24 - 44 %    Monocytes 12 (H) 1 - 7 %    Eosinophils % 2 0 - 4 %    Basophils 1 0 - 2 %    Segs Absolute 2.10 1.3 - 9.1 k/uL    Absolute Lymph # 1.00 1.0 - 4.8 k/uL    Absolute Mono # 0.40 0.1 - 1.3 k/uL    Absolute Eos # 0.10 0.0 - 0.4 k/uL    Basophils Absolute 0.00 0.0 - 0.2 k/uL   BMP   Result Value Ref Range    Glucose 98 70 - 99 mg/dL    BUN 12 6 - 20 mg/dL    Creatinine 1.10 0.70 - 1.20 mg/dL    Calcium 9.6 8.6 - 10.4 mg/dL    Sodium 139 135 - 144 mmol/L    Potassium 4.3 3.7 - 5.3 mmol/L    Chloride 100 98 - 107 mmol/L    CO2 30 20 - 31 mmol/L    Anion Gap 9 9 - 17 mmol/L    Est, Glom Filt Rate >60 >60 mL/min/1.73m2   CK   Result Value Ref Range    Total  (H) 39 - 308 U/L   D-Dimer, Quantitative   Result Value Ref Range    D-Dimer, Quant 0.68 (H) 0.00 - 0.59 mg/L FEU Sedimentation Rate   Result Value Ref Range    Sed Rate 6 0 - 15 mm/Hr   C-Reactive Protein   Result Value Ref Range    CRP <3.0 0.0 - 5.0 mg/L       RADIOLOGY:  No results found. EKG Interpretation    None    EMERGENCY DEPARTMENT COURSE:  ED Course as of 10/04/22 0052   Mon Oct 03, 2022   2248 Patient evaluated bedside. Left lower extremity swelling for several days going from knee all the way down to ankle. No injuries or traumas. Patient has had rhabdomyolysis in the past secondary working out. There are pulses and sensation intact range of motion limited at knee due to pain we will get x-rays we will get labs [ZE]   Tue Oct 04, 2022   0038 Patient's labs not consistent with infectious process or rhabdo or compartment syndrome in either his exam.  D-dimer elevated which is concerned for blood clot. Will give patient dose of Lovenox here in the department. Will have patient come back for syncopal morning to get left lower extremity Doppler. Bedside ultrasound did not show cystic structure which were concern for possible ruptured Baker's cyst [ZE]      ED Course User Index  [ZE] Caio Bound, DO       PROCEDURES:  Procedures     CONSULTS:  None    CRITICAL CARE:  None    MDM  Patient for left lower extremity swelling. Sensation pulses intact. We did draw labs to evaluate for rhabdo which were unremarkable and no inflammatory marker elevation. X-rays unremarkable. Differential at this point is DVT versus ruptured Baker's cyst.  Given elevation in D-dimer we did give patient Lovenox had patient return in the morning for Doppler ultrasound    FINAL IMPRESSION      1.  Leg swelling          DISPOSITION / PLAN     DISPOSITION Decision To Discharge 10/04/2022 12:52:34 AM      PATIENT REFERRED TO:  [facility] ED  Asha [place] [id number]  [phone]  Today  For ultrasound and evaluation for blood clot in the left lower extremity    DISCHARGE MEDICATIONS:  New Prescriptions ACETAMINOPHEN (TYLENOL) 500 MG TABLET    Take 2 tablets by mouth every 8 hours as needed for Pain    IBUPROFEN (ADVIL;MOTRIN) 800 MG TABLET    Take 1 tablet by mouth every 8 hours as needed for Pain       Bishop Parson DO  Emergency Medicine Resident    (Please note that portions of thisnote were completed with a voice recognition program.  Efforts were made to edit the dictations but occasionally words are mis-transcribed.)        Crow Soriano DO  Resident  10/04/22 0709

## 2022-10-15 ENCOUNTER — APPOINTMENT (OUTPATIENT)
Dept: CT IMAGING | Age: 42
End: 2022-10-15
Payer: COMMERCIAL

## 2022-10-15 ENCOUNTER — HOSPITAL ENCOUNTER (EMERGENCY)
Age: 42
Discharge: HOME | End: 2022-10-15
Attending: EMERGENCY MEDICINE
Payer: COMMERCIAL

## 2022-10-15 VITALS
OXYGEN SATURATION: 93 % | HEART RATE: 70 BPM | RESPIRATION RATE: 12 BRPM | TEMPERATURE: 97.8 F | SYSTOLIC BLOOD PRESSURE: 124 MMHG | DIASTOLIC BLOOD PRESSURE: 89 MMHG

## 2022-10-15 DIAGNOSIS — S09.90XA INJURY OF HEAD, INITIAL ENCOUNTER: Primary | ICD-10-CM
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
## 2022-10-15 LAB
ABO/RH: NORMAL
ANION GAP SERPL CALCULATED.3IONS-SCNC: 15 MMOL/L (ref 9–17)
ANTIBODY SCREEN: NEGATIVE
ARM BAND NUMBER: NORMAL
BLOOD BANK SPECIMEN: ABNORMAL
BUN BLDV-MCNC: 22 MG/DL (ref 6–20)
CARBOXYHEMOGLOBIN: 1.6 % (ref 0–5)
CHLORIDE BLD-SCNC: 99 MMOL/L (ref 98–107)
CO2: 24 MMOL/L (ref 20–31)
CREAT SERPL-MCNC: 0.64 MG/DL (ref 0.7–1.2)
ETHANOL PERCENT: 0.43 %
ETHANOL: 431 MG/DL
EXPIRATION DATE: NORMAL
FIO2: ABNORMAL
GFR SERPL CREATININE-BSD FRML MDRD: >60 ML/MIN/1.73M2
GLUCOSE BLD-MCNC: 83 MG/DL (ref 70–99)
HCG QUALITATIVE: ABNORMAL
HCO3 VENOUS: 23.6 MMOL/L (ref 24–30)
HCT VFR BLD CALC: 37.5 % (ref 40.7–50.3)
HEMOGLOBIN: 12.4 G/DL (ref 13–17)
INR BLD: 1.3
MCH RBC QN AUTO: 26.7 PG (ref 25.2–33.5)
MCHC RBC AUTO-ENTMCNC: 33.1 G/DL (ref 28.4–34.8)
MCV RBC AUTO: 80.8 FL (ref 82.6–102.9)
NEGATIVE BASE EXCESS, VEN: 1.9 MMOL/L (ref 0–2)
NRBC AUTOMATED: 0 PER 100 WBC
O2 SAT, VEN: 86.1 % (ref 60–85)
PARTIAL THROMBOPLASTIN TIME: 27.9 SEC (ref 20.5–30.5)
PATIENT TEMP: 37
PCO2, VEN: 45.5 MM HG (ref 39–55)
PDW BLD-RTO: 15.3 % (ref 11.8–14.4)
PH VENOUS: 7.33 (ref 7.32–7.42)
PLATELET # BLD: 289 K/UL (ref 138–453)
PMV BLD AUTO: 9.2 FL (ref 8.1–13.5)
PO2, VEN: 61.7 MM HG (ref 30–50)
POTASSIUM SERPL-SCNC: 4 MMOL/L (ref 3.7–5.3)
PROTHROMBIN TIME: 13.4 SEC (ref 9.1–12.3)
RBC # BLD: 4.64 M/UL (ref 4.21–5.77)
SODIUM BLD-SCNC: 138 MMOL/L (ref 135–144)
WBC # BLD: 5.9 K/UL (ref 3.5–11.3)

## 2022-10-15 PROCEDURE — 2500000003 HC RX 250 WO HCPCS: Performed by: EMERGENCY MEDICINE

## 2022-10-15 PROCEDURE — 85610 PROTHROMBIN TIME: CPT

## 2022-10-15 PROCEDURE — 70450 CT HEAD/BRAIN W/O DYE: CPT

## 2022-10-15 PROCEDURE — 82565 ASSAY OF CREATININE: CPT

## 2022-10-15 PROCEDURE — 80051 ELECTROLYTE PANEL: CPT

## 2022-10-15 PROCEDURE — 86850 RBC ANTIBODY SCREEN: CPT

## 2022-10-15 PROCEDURE — 3209999900 CT THORACIC SPINE TRAUMA RECONSTRUCTION

## 2022-10-15 PROCEDURE — 99285 EMERGENCY DEPT VISIT HI MDM: CPT

## 2022-10-15 PROCEDURE — 85027 COMPLETE CBC AUTOMATED: CPT

## 2022-10-15 PROCEDURE — 84520 ASSAY OF UREA NITROGEN: CPT

## 2022-10-15 PROCEDURE — 86901 BLOOD TYPING SEROLOGIC RH(D): CPT

## 2022-10-15 PROCEDURE — 72125 CT NECK SPINE W/O DYE: CPT

## 2022-10-15 PROCEDURE — 6360000002 HC RX W HCPCS: Performed by: EMERGENCY MEDICINE

## 2022-10-15 PROCEDURE — 6360000002 HC RX W HCPCS

## 2022-10-15 PROCEDURE — 3209999900 CT LUMBAR SPINE TRAUMA RECONSTRUCTION

## 2022-10-15 PROCEDURE — 6360000004 HC RX CONTRAST MEDICATION: Performed by: STUDENT IN AN ORGANIZED HEALTH CARE EDUCATION/TRAINING PROGRAM

## 2022-10-15 PROCEDURE — 82947 ASSAY GLUCOSE BLOOD QUANT: CPT

## 2022-10-15 PROCEDURE — 71260 CT THORAX DX C+: CPT

## 2022-10-15 PROCEDURE — 85730 THROMBOPLASTIN TIME PARTIAL: CPT

## 2022-10-15 PROCEDURE — 86900 BLOOD TYPING SEROLOGIC ABO: CPT

## 2022-10-15 PROCEDURE — 96374 THER/PROPH/DIAG INJ IV PUSH: CPT

## 2022-10-15 PROCEDURE — 84703 CHORIONIC GONADOTROPIN ASSAY: CPT

## 2022-10-15 PROCEDURE — G0480 DRUG TEST DEF 1-7 CLASSES: HCPCS

## 2022-10-15 PROCEDURE — 82805 BLOOD GASES W/O2 SATURATION: CPT

## 2022-10-15 PROCEDURE — 94761 N-INVAS EAR/PLS OXIMETRY MLT: CPT

## 2022-10-15 RX ORDER — MIDAZOLAM HYDROCHLORIDE 2 MG/2ML
2 INJECTION, SOLUTION INTRAMUSCULAR; INTRAVENOUS ONCE
Status: DISCONTINUED | OUTPATIENT
Start: 2022-10-15 | End: 2022-10-15 | Stop reason: HOSPADM

## 2022-10-15 RX ORDER — HALOPERIDOL 5 MG/ML
INJECTION INTRAMUSCULAR DAILY PRN
Status: COMPLETED | OUTPATIENT
Start: 2022-10-15 | End: 2022-10-15

## 2022-10-15 RX ORDER — HALOPERIDOL 5 MG/ML
5 INJECTION INTRAMUSCULAR ONCE
Status: COMPLETED | OUTPATIENT
Start: 2022-10-15 | End: 2022-10-15

## 2022-10-15 RX ORDER — TRANEXAMIC ACID 100 MG/ML
1000 INJECTION, SOLUTION INTRAVENOUS ONCE
Status: COMPLETED | OUTPATIENT
Start: 2022-10-15 | End: 2022-10-15

## 2022-10-15 RX ORDER — HALOPERIDOL 5 MG/ML
INJECTION INTRAMUSCULAR
Status: DISCONTINUED
Start: 2022-10-15 | End: 2022-10-15 | Stop reason: HOSPADM

## 2022-10-15 RX ORDER — HALOPERIDOL 5 MG/ML
INJECTION INTRAMUSCULAR
Status: COMPLETED
Start: 2022-10-15 | End: 2022-10-15

## 2022-10-15 RX ADMIN — IOPAMIDOL 130 ML: 755 INJECTION, SOLUTION INTRAVENOUS at 08:27

## 2022-10-15 RX ADMIN — HALOPERIDOL LACTATE 5 MG: 5 INJECTION, SOLUTION INTRAMUSCULAR at 09:59

## 2022-10-15 RX ADMIN — HALOPERIDOL 5 MG: 5 INJECTION INTRAMUSCULAR at 09:59

## 2022-10-15 RX ADMIN — Medication 5 MG: at 08:00

## 2022-10-15 RX ADMIN — TRANEXAMIC ACID 1000 MG: 100 INJECTION, SOLUTION INTRAVENOUS at 10:06

## 2022-10-15 ASSESSMENT — PAIN SCALES - GENERAL: PAINLEVEL_OUTOF10: 0

## 2022-10-15 ASSESSMENT — PAIN - FUNCTIONAL ASSESSMENT: PAIN_FUNCTIONAL_ASSESSMENT: 0-10

## 2022-10-15 NOTE — DISCHARGE INSTRUCTIONS
Stop drinking alcohol. Call [facility] [phone] or go to [facility] at 7am Monday - Friday to be evaluated (this is first come first serve). PLEASE RETURN TO THE EMERGENCY DEPARTMENT IMMEDIATELY for worsening symptoms, or if you develop any concerning symptoms such as: high fever not relieved by acetaminophen (Tylenol) and/or ibuprofen (Motrin), chills, shortness of breath, chest pain, persistent nausea and/or vomiting, vomiting any blood, blood in your stool, numbness, weakness or tingling in the arms or legs or change in color of the extremities, changes in mental status, persistent headache, blurry vision.

## 2022-10-15 NOTE — ED PROVIDER NOTES
[facility] ED     Emergency Department     Faculty Attestation    I performed a history and physical examination of the patient and discussed management with the resident. I reviewed the residents note and agree with the documented findings and plan of care. Any areas of disagreement are noted on the chart. I was personally present for the key portions of any procedures. I have documented in the chart those procedures where I was not present during the key portions. I have reviewed the emergency nurses triage note. I agree with the chief complaint, past medical history, past surgical history, allergies, medications, social and family history as documented unless otherwise noted below. For Physician Assistant/ Nurse Practitioner cases/documentation I have personally evaluated this patient and have completed at least one if not all key elements of the E/M (history, physical exam, and MDM). Additional findings are as noted. Patient brought in by a coworker after he showed up at work with confusion and blood from his face. Is on known the patient had a fall or what the actual trauma was. Patient is currently on Eliquis for recent diagnosis of DVT. Patient was originally put into a regular room but upon resident assessment, patient was upgraded to a trauma priority and moved to the trauma bay. Airway is intact and breath sounds are equal bilaterally. GCS is 14. He does have blood from the nose. Patient was given 5 of Haldol in the trauma bay due to not being cooperative with exam.  Will await imaging and trauma surgery recommendations.       Magali Ha MD  Attending Emergency  Physician            [place] MD  10/15/22 6690

## 2022-10-15 NOTE — ED NOTES
Critical lab taken for ethanol of 2 [facility], [address] [place] [address]  10/15/22 3078

## 2022-10-15 NOTE — ED PROVIDER NOTES
[facility]  ED  Emergency Department Encounter  EmergencyMedicine Resident     Pt Name:Tyrese Rajan  MRN: [id number]  Birthdate 1980  Date of evaluation: 10/15/22  PCP:  Emmanuelle Houston MD    CHIEF COMPLAINT       Chief Complaint   Patient presents with    Fall     On blood thinner       HISTORY OF PRESENT ILLNESS  (Location/Symptom, Timing/Onset, Context/Setting, Quality, Duration, Modifying Factors, Severity.)      Carmelia Saint is a 46 y.o. male who presents with altered mental status after potential fall. Patient was brought in by a coworker after he walked up to her and stated that he \"fell\", patient was noted to be bleeding around his nares and around his mouth. Patient was noted to be altered on evaluation, not answering questions appropriately, was aware of person, not place, events or time. Patient was noted to be slurring his speech. Discussion with the person that brought him in there is concern that he is not acting himself. Patient was noted to be \"passing out\" while she was driving him here. Patient was recently started on blood thinners for DVT, was given Eliquis starter pack. PAST MEDICAL / SURGICAL / SOCIAL / FAMILY HISTORY      has a past medical history of Asthma, Essential hypertension, High cholesterol, and Seasonal allergies. DVT, no additional pertinent     has a past surgical history that includes transesophageal echocardiogram (9/13/14).   No additional pertinent    Social History     Socioeconomic History    Marital status:      Spouse name: Not on file    Number of children: Not on file    Years of education: Not on file    Highest education level: Not on file   Occupational History    Not on file   Tobacco Use    Smoking status: Never    Smokeless tobacco: Never   Substance and Sexual Activity    Alcohol use: Yes     Comment: Pt states he last drank on 5/30/2021    Drug use: No    Sexual activity: Not on file   Other Topics Concern    Not on file   Social History Narrative    Not on file     Social Determinants of Health     Financial Resource Strain: Not on file   Food Insecurity: Not on file   Transportation Needs: Not on file   Physical Activity: Not on file   Stress: Not on file   Social Connections: Not on file   Intimate Partner Violence: Not on file   Housing Stability: Not on file       Family History   Problem Relation Age of Onset    Stroke Maternal Grandmother         cousin    High Blood Pressure Maternal Grandmother     Cancer Other         G. Mother , Cervix    Alcohol Abuse Father         G. Parents, Uncles. Allergies:  Patient has no known allergies. Home Medications:  Prior to Admission medications    Medication Sig Start Date End Date Taking? Authorizing Provider   ibuprofen (ADVIL;MOTRIN) 800 MG tablet Take 1 tablet by mouth every 8 hours as needed for Pain 10/4/22 10/6/22  Guillermo Savage, DO   acetaminophen (TYLENOL) 500 MG tablet Take 2 tablets by mouth every 8 hours as needed for Pain 10/4/22 10/6/22  Guillermo Savage, DO   apixaban starter pack (ELIQUIS DVT/PE STARTER PACK) 5 MG TBPK tablet Take 1 tablet by mouth See Admin Instructions 10/4/22   Moreno Palma,    metoprolol tartrate (LOPRESSOR) 25 MG tablet Take 0.5 tablets by mouth in the morning and 0.5 tablets before bedtime.  8/3/22   Bard Soumya MD   ibuprofen (ADVIL;MOTRIN) 600 MG tablet Take 1 tablet by mouth 3 times daily as needed for Pain 7/5/21   Lamont Brice MD   magnesium oxide (MAG-OX) 400 (241.3 Mg) MG TABS tablet Take 1 tablet by mouth 2 times daily 6/26/21   Benigno Sinha MD   Potassium 99 MG TABS Take 99 mg by mouth as needed     Historical Provider, MD   cetirizine (ZYRTEC) 10 MG tablet Take 10 mg by mouth daily    Historical Provider, MD   albuterol sulfate HFA (PROAIR HFA) 108 (90 Base) MCG/ACT inhaler Inhale 2 puffs into the lungs every 6 hours as needed for Wheezing or Shortness of Breath 4/24/20   Bard Soumya MD REVIEW OF SYSTEMS    (2-9 systems for level 4, 10 or more for level 5)      Review of Systems   Unable to perform ROS: Mental status change     PHYSICAL EXAM   (up to 7 for level 4, 8 or more for level 5)      INITIAL VITALS:   /89   Pulse 70   Temp 97.8 °F (36.6 °C) (Oral)   Resp 12   SpO2 93%     Physical Exam  Constitutional:       Appearance: Normal appearance. HENT:      Head: Normocephalic. Comments: Abrasion to the forehead     Mouth/Throat:      Mouth: Mucous membranes are moist.      Pharynx: Oropharynx is clear. Eyes:      Extraocular Movements: Extraocular movements intact. Conjunctiva/sclera: Conjunctivae normal.   Cardiovascular:      Rate and Rhythm: Normal rate and regular rhythm. Pulses: Normal pulses. Heart sounds: Normal heart sounds. No murmur heard. Pulmonary:      Effort: Pulmonary effort is normal.      Breath sounds: Normal breath sounds. Abdominal:      General: Bowel sounds are normal. There is no distension. Tenderness: There is no abdominal tenderness. There is no guarding. Musculoskeletal:         General: Normal range of motion. Comments: Range of motion noted to be normal with patient's natural movements   Skin:     General: Skin is warm and dry. Findings: No rash (On exposed skin). Neurological:      Mental Status: He is alert. He is disoriented and confused. GCS: GCS eye subscore is 4. GCS verbal subscore is 4. GCS motor subscore is 6.       Coordination: Coordination abnormal.   Psychiatric:         Mood and Affect: Mood normal.         Behavior: Behavior normal.       DIFFERENTIAL  DIAGNOSIS     PLAN (LABS / IMAGING / EKG):  Orders Placed This Encounter   Procedures    CT HEAD WO CONTRAST    CT CHEST ABDOMEN PELVIS W CONTRAST Additional Contrast? None    CT CERVICAL SPINE WO CONTRAST    CT THORACIC SPINE TRAUMA RECONSTRUCTION    CT LUMBAR SPINE TRAUMA RECONSTRUCTION    Trauma Panel    Type and Screen       MEDICATIONS ORDERED:  Orders Placed This Encounter   Medications    haloperidol lactate (HALDOL) 5 MG/ML injection     BAKARI HARRISON: cabinet override    DISCONTD: haloperidol lactate (HALDOL) 5 MG/ML injection     BAKARI HARRISON: cabinet override    haloperidol lactate (HALDOL) injection    iopamidol (ISOVUE-370) 76 % injection 130 mL    haloperidol lactate (HALDOL) injection 5 mg    tranexamic acid (CYKLOKAPRON) injection 1,000 mg    DISCONTD: midazolam PF (VERSED) injection 2 mg       DIAGNOSTIC RESULTS / EMERGENCY DEPARTMENT COURSE / MDM   LAB RESULTS:  Results for orders placed or performed during the hospital encounter of 10/15/22   Trauma Panel   Result Value Ref Range    Ethanol 431 (HH) <10 mg/dL    Ethanol percent 0.431 (H) <0.010 %    Blood Bank Specimen BILL FOR SERVICES PERFORMED     BUN 22 (H) 6 - 20 mg/dL    WBC 5.9 3.5 - 11.3 k/uL    RBC 4.64 4.21 - 5.77 m/uL    Hemoglobin 12.4 (L) 13.0 - 17.0 g/dL    Hematocrit 37.5 (L) 40.7 - 50.3 %    MCV 80.8 (L) 82.6 - 102.9 fL    MCH 26.7 25.2 - 33.5 pg    MCHC 33.1 28.4 - 34.8 g/dL    RDW 15.3 (H) 11.8 - 14.4 %    Platelets 454 282 - 985 k/uL    MPV 9.2 8.1 - 13.5 fL    NRBC Automated 0.0 0.0 per 100 WBC    Creatinine 0.64 (L) 0.70 - 1.20 mg/dL    Est, Glom Filt Rate >60 >60 mL/min/1.73m2    Glucose 83 70 - 99 mg/dL    hCG Qual PT MALE     Sodium 138 135 - 144 mmol/L    Potassium 4.0 3.7 - 5.3 mmol/L    Chloride 99 98 - 107 mmol/L    CO2 24 20 - 31 mmol/L    Anion Gap 15 9 - 17 mmol/L    Protime 13.4 (H) 9.1 - 12.3 sec    INR 1.3     PTT 27.9 20.5 - 30.5 sec    pH, Norm 7.335 7.320 - 7.420    pCO2, Norm 45.5 39 - 55 mm Hg    pO2, Norm 61.7 (H) 30 - 50 mm Hg    HCO3, Venous 23.6 (L) 24 - 30 mmol/L    Negative Base Excess, Norm 1.9 0.0 - 2.0 mmol/L    O2 Sat, Norm 86.1 (H) 60.0 - 85.0 %    Carboxyhemoglobin 1.6 0 - 5 %    Pt Temp 37.0     FIO2 INFORMATION NOT PROVIDED    TYPE AND SCREEN   Result Value Ref Range    Expiration Date 10/18/2022,2359     Arm Band Number BE [id number] ABO/Rh B POSITIVE     Antibody Screen NEGATIVE        RADIOLOGY:  CT CHEST ABDOMEN PELVIS W CONTRAST Additional Contrast? None   Final Result   CHEST:      1. Mild dependent atelectasis and respiratory motion. 2. No acute osseous abnormality. 3. No dissection flap within the visualized thoracic aorta. No pericardial   or pleural effusions. No periaortic or mediastinal hemorrhage. ABDOMEN/PELVIS:      1. Indeterminate fluid density lesion in association with lateral abdominal   sidewall/musculature measuring 4.1 x 2.5 cm.   2. Small midline fat containing periumbilical hernia. 3. Mild colonic diverticulosis. 4. Gallbladder distension. Fatty liver. 5. No free intra-abdominal air. No focal bowel wall thickening or interloop   fluid. CT THORACIC SPINE TRAUMA RECONSTRUCTION   Final Result   THORACIC SPINE:      No clear evidence for acute fracture or malalignment within the thoracic   spine. LUMBAR SPINE:      1. Mild multilevel degenerative changes in the lumbar spine. 2. No clear evidence for acute fracture or malalignment within the lumbar   spine. Please see CT chest, abdomen, and pelvis report from 10/15/2022 for details   regarding intrathoracic, intra-abdominal, and intrapelvic findings. CT LUMBAR SPINE TRAUMA RECONSTRUCTION   Final Result   THORACIC SPINE:      No clear evidence for acute fracture or malalignment within the thoracic   spine. LUMBAR SPINE:      1. Mild multilevel degenerative changes in the lumbar spine. 2. No clear evidence for acute fracture or malalignment within the lumbar   spine. Please see CT chest, abdomen, and pelvis report from 10/15/2022 for details   regarding intrathoracic, intra-abdominal, and intrapelvic findings. CT HEAD WO CONTRAST   Final Result      1. Right lateral forehead to right temple scalp contusion with minimal   hematoma. No evidence of skull fracture. 2.  No acute intracranial abnormality noted. 3.  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 CT CERVICAL SPINE WO CONTRAST   Final Result      1. Mild C5-C6 degenerative disc disease. No fracture or acute abnormality   noted. 2.  Degree of posterior disc bulging C3-C4 that evidence of any definite   neural impingement. EMERGENCY DEPARTMENT COURSE:  ED Course as of 10/16/22 1228   Sat Oct 15, 2022   2698 Patient noted to have high ethanol level. Will observe patient till clinically sober for reevaluation due to concerns of head injury on Eliquis. [CR]   1230 Patient reevaluated answering questions appropriately. Patient does appear to still have slight elements intoxication such as slurred speech but will answer questions and alert and oriented. [CR]   1626 Patient ambulated with a steady gait, answering questions appropriate alert and oriented. [CR]      ED Course User Index  [CR] Traci Nolen,           PROCEDURES:  none    CONSULTS:  None    MEDICAL DECISION MAKING:  Patient presenting with concerns for head injury on anticoagulation due to DVT. Patient had slurred speech, not acting appropriately combative. Patient was upgraded to a trauma priority for concerns of internal cranial hemorrhage and traumatic injuries. Trauma team assessed patient in the trauma bay. On further work-up patient was noted to have no intracranial hemorrhage, slight hematoma externally, elevated alcohol level of 430. Patient was observed throughout the day for about 9 hours, patient was alert and oriented, ambulating with a steady gait, answering questions appropriately. Patient states that he drinks heavily on a daily basis. Patient was noted to be clinically sober on evaluation and discharge. Patient was reevaluated multiple times throughout the day, had improving mentation improving neurologic status and no concern for worsening intracranial hemorrhage at this time.   Patient was discharged home for further follow-up by primary care doctor. Patient encouraged to not drink while on Eliquis due to the risk of falling and intracranial bleeding. Patient was agreeable with that. Patient was provided resources for alcohol cessation. Patient was instructed return for any worsening headache, neurologic changes, lightheadedness or dizziness or any other concerns the patient may have. Patient was discharged home in stable condition. CRITICAL CARE:  Please see attending note    FINAL IMPRESSION      1. Injury of head, initial encounter    2. Blood alcohol level of 240 mg/100 ml or more      DISPOSITION / PLAN     DISPOSITION Decision To Discharge 10/15/2022 04:27:42 PM      PATIENT REFERRED TO:  Millie Lopez MD  [phone] [place] [phone]    Schedule an appointment as soon as possible for a visit       [place] [address] [address]  [phone]      Please call this facility for detox.     DISCHARGE MEDICATIONS:  Discharge Medication List as of 10/15/2022  4:29 PM          Heather Crandall DO  Emergency Medicine Resident    (Please note that portions of thisnote were completed with a voice recognition program.  Efforts were made to edit the dictations but occasionally words are mis-transcribed.)        Heather Crandall DO  Resident  10/16/22 [id number]

## 2022-10-15 NOTE — ED NOTES
AMPLE history obtained during trauma assessment, following stated by patient:    Allergies:  N/a     Medications: eliquis     Medical History: blood clot in left leg    Time of Last Meal:    Tetanus: []>5 years    [] <5 years    []Unknown     Piero  [] N/A  Para   [] N/A  Ab   [] N/A  LNMP   [] N/A      Trauma Location: County:    City:    Road:   [place]:   Mechanism: FALL  Injury Date: 10/15/22  Injury Time:   Arrived Via: car to ED escorted by coworker       Zeke Shows obtained by ** at this time. Labs obtained include:       [x] Trauma Profile    [] Type and Cross     [x] Type and Screen    []ABG      []VBG    [] Cardiac Enzymes    []PFA    []Urinalysis     []GHULAM    []TEG    []Standard Teg    []Rapid Teg    []Platelet Mapping    []Rapid COVID    Mass Transfusion Protocol Activated?   [] Yes [x] N/A  If activated, tally total units below:    PRBC:     FFP:    Platelets:    Cryo:             Pepito Melo RN  10/15/22 [address] [facility] [place], RN  10/15/22 0707

## 2022-10-15 NOTE — ED NOTES
Pt placed on @L NC due to desatting to 88% while in Ct tolerating well     Les Olvera RN  10/15/22 [id number]

## 2022-10-15 NOTE — ED NOTES
Pt presents to the ED via wc to room escorted by coworker for being at work for being found with redness and edema above the right eyebrow  Pt denies a fall  Pt states he was recently started on a blood thinner due to a blood clot in the left leg  Pt presents with a nose bleed and upper lip bleeding  Pt is aggravated when asked questions  Pt resting on cot         Tiana Celestin RN  10/15/22 [id number]

## 2022-10-15 NOTE — H&P
TRAUMA HISTORY AND PHYSICAL EXAMINATION    PATIENT NAME: Rubi Goodwin  YOB: 1980  MEDICAL RECORD NO. [id number]   DATE: 10/15/2022  PRIMARY CARE PHYSICIAN: Dolly Cho MD  PATIENT EVALUATED AT THE REQUEST OF : Liz Crowe    ACTIVATION   []Trauma Alert     [x] Trauma Priority     []Trauma Consult. IMPRESSION:     Patient Active Problem List   Diagnosis    Seasonal allergies    Mild intermittent asthma without complication    Essential hypertension    History of stroke    High cholesterol    Medically noncompliant    Rhabdomyolysis       MEDICAL DECISION MAKING AND PLAN:       Head Injury   Imaging with no obvious defect   Maintain C spine until patient clinically sober   ETOH 431   TERT when clinically stable    CONSULT SERVICES    [] Neurosurgery     [] Orthopedic Surgery    [] Cardiothoracic     [] Facial Trauma    [] Plastic Surgery (Burn)    [] Pediatric Surgery     [] Internal Medicine    [] Pulmonary Medicine    [] Other:        HISTORY:     Chief Complaint:  \"Nosebleed, confused\"    INJURY SUMMARY  Nosebleed, frontal head bruising, abrasions    If intracranial hemorrhage is present, is it a:  [] BIG 1  [] BIG 2  [] BIG 3    GENERAL DATA  Age 46 y.o.  male   Patient information was obtained from caregiver / friend. History/Exam limitations: mental status. Patient presented to the Emergency Department by private vehicle. Injury Date: 10/15/2022   Approximate Injury Time: 7AM        Transport mode:   []Ambulance      [] Helicopter     [x]Car       [] Other  Referring Hospital: none    INJURY LOCATION, (e.g., home, farm, industry, street)  Specific Details of Location (e.g., bedroom, kitchen, garage): unknown  Type of Residence (if occurred in home setting) (e.g., apartment, mobile home, single family home): unknown    MECHANISM OF INJURY    [x] Other __unkown MOI_    [] Other protective devices used / worn ___________________________    HISTORY:     Rubi Goodwin is a 46 y.o. male that presented to the Emergency Department after coming to work confused with a bloody nose and bruising to the head. Patients fried at work brought him to the ED as she was concerned at his behavior. In the ED patient cannot say what happened other than \"I was going to work\". Patient is disoriented but alert to voice. Patient is on lovenox for a DVT diagnosed last week to the left  leg. Loss of Consciousness []No   []Yes Duration(min)       [x] Unknown     Total Fluids Given Prior To Arrival  mL    MEDICATIONS:   []  None     []  Information not available due to exam limitations documented above    Prior to Admission medications    Medication Sig Start Date End Date Taking? Authorizing Provider   ibuprofen (ADVIL;MOTRIN) 800 MG tablet Take 1 tablet by mouth every 8 hours as needed for Pain 10/4/22 10/6/22  Ethel Son, DO   acetaminophen (TYLENOL) 500 MG tablet Take 2 tablets by mouth every 8 hours as needed for Pain 10/4/22 10/6/22  Franco Son, DO   apixaban starter pack (ELIQUIS DVT/PE STARTER PACK) 5 MG TBPK tablet Take 1 tablet by mouth See Admin Instructions 10/4/22   Julieta Palma,    metoprolol tartrate (LOPRESSOR) 25 MG tablet Take 0.5 tablets by mouth in the morning and 0.5 tablets before bedtime.  8/3/22   Iris Briggs MD   ibuprofen (ADVIL;MOTRIN) 600 MG tablet Take 1 tablet by mouth 3 times daily as needed for Pain 7/5/21   Lamont Catherine MD   magnesium oxide (MAG-OX) 400 (241.3 Mg) MG TABS tablet Take 1 tablet by mouth 2 times daily 6/26/21   Abdullahi Gosselin, MD   Potassium 99 MG TABS Take 99 mg by mouth as needed     Historical Provider, MD   cetirizine (ZYRTEC) 10 MG tablet Take 10 mg by mouth daily    Historical Provider, MD   albuterol sulfate HFA (PROAIR HFA) 108 (90 Base) MCG/ACT inhaler Inhale 2 puffs into the lungs every 6 hours as needed for Wheezing or Shortness of Breath 4/24/20   Iris Briggs MD       ALLERGIES:   []  None    [x]   Information not available due to exam limitations documented above     Patient has no known allergies. PAST MEDICAL HISTORY: []  None   [x]   Information not available due to exam limitations documented above      has a past medical history of Asthma, Essential hypertension, High cholesterol, and Seasonal allergies. has a past surgical history that includes transesophageal echocardiogram (9/13/14). FAMILY HISTORY   [x]   Information not available due to exam limitations documented above    family history includes Alcohol Abuse in his father; Cancer in an other family member; High Blood Pressure in his maternal grandmother; Stroke in his maternal grandmother. SOCIAL HISTORY  []   Information not available due to exam limitations documented above     reports that he has never smoked. He has never used smokeless tobacco.   reports current alcohol use. reports no history of drug use. Review of Systems:    []   Information not available due to exam limitations documented above    Review of Systems   Unable to perform ROS: Mental status change         PHYSICAL EXAMINATION:     GLASCOW COMA SCALE  NEUROMUSCULAR BLOCKADE PRIOR TO ARRIVAL     [x]No        []Yes      Variable  Score   Variable  Score  Eye opening [x]Spontaneous 4 Verbal  []Oriented  5     []To voice  3   []Confused  4    []To pain  2   [x]Inapp words  3    []None  1   []Incomp words 2       []None  1   Motor   []Obeys  6    [x]Localizes pain 5    []Withdraws(pain) 4    []Flexion(pain) 3  []Extension(pain) 2    []None  1     GCS Total = 13    PHYSICAL EXAMINATION    VITAL SIGNS:   Vitals:    10/15/22 1028   BP: 115/74   Pulse: 69   Resp: 13   Temp:    SpO2: 93%       Physical Exam  Vitals and nursing note reviewed. Constitutional:       Appearance: He is normal weight. He is not ill-appearing or toxic-appearing.    HENT:      Right Ear: External ear normal.      Left Ear: External ear normal.      Nose:      Comments: Blood present from both nares Mouth/Throat:      Mouth: Mucous membranes are moist.   Eyes:      Conjunctiva/sclera: Conjunctivae normal.      Pupils: Pupils are equal, round, and reactive to light. Cardiovascular:      Rate and Rhythm: Normal rate and regular rhythm. Pulses: Normal pulses. Pulmonary:      Effort: Pulmonary effort is normal.      Breath sounds: Normal breath sounds. Abdominal:      General: Abdomen is flat. Bowel sounds are normal. There is no distension. Palpations: Abdomen is soft. Tenderness: There is no abdominal tenderness. Musculoskeletal:         General: Swelling present. No tenderness, deformity or signs of injury. Normal range of motion. Cervical back: Normal range of motion. No tenderness. Right lower leg: No edema. Left lower leg: Edema present. Comments: Bruising to the left frontal scalp   Skin:     General: Skin is warm and dry. Findings: Bruising present. Neurological:      Mental Status: He is alert. He is disoriented. FOCUSED ABDOMINAL SONOGRAM FOR TRAUMA (FAST): A good  quality examination was performed by Dr. Esau Thibodeaux and representative images were obtained. [x] No free fluid in the abdomen   [] Free fluid in RUQ   [] Free fluid in LUQ  [] Free fluid in Pelvis  [] Pericardial fluid  [] Other:        RADIOLOGY  CT CHEST ABDOMEN PELVIS W CONTRAST Additional Contrast? None   Final Result   CHEST:      1. Mild dependent atelectasis and respiratory motion. 2. No acute osseous abnormality. 3. No dissection flap within the visualized thoracic aorta. No pericardial   or pleural effusions. No periaortic or mediastinal hemorrhage. ABDOMEN/PELVIS:      1. Indeterminate fluid density lesion in association with lateral abdominal   sidewall/musculature measuring 4.1 x 2.5 cm.   2. Small midline fat containing periumbilical hernia. 3. Mild colonic diverticulosis. 4. Gallbladder distension. Fatty liver. 5. No free intra-abdominal air.   No focal bowel wall thickening or interloop   fluid. CT THORACIC SPINE TRAUMA RECONSTRUCTION   Final Result   THORACIC SPINE:      No clear evidence for acute fracture or malalignment within the thoracic   spine. LUMBAR SPINE:      1. Mild multilevel degenerative changes in the lumbar spine. 2. No clear evidence for acute fracture or malalignment within the lumbar   spine. Please see CT chest, abdomen, and pelvis report from 10/15/2022 for details   regarding intrathoracic, intra-abdominal, and intrapelvic findings. CT LUMBAR SPINE TRAUMA RECONSTRUCTION   Final Result   THORACIC SPINE:      No clear evidence for acute fracture or malalignment within the thoracic   spine. LUMBAR SPINE:      1. Mild multilevel degenerative changes in the lumbar spine. 2. No clear evidence for acute fracture or malalignment within the lumbar   spine. Please see CT chest, abdomen, and pelvis report from 10/15/2022 for details   regarding intrathoracic, intra-abdominal, and intrapelvic findings. CT HEAD WO CONTRAST   Final Result      1. Right lateral forehead to right temple scalp contusion with minimal   hematoma. No evidence of skull fracture. 2.  No acute intracranial abnormality noted. 3.  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 CT CERVICAL SPINE WO CONTRAST   Final Result      1. Mild C5-C6 degenerative disc disease. No fracture or acute abnormality   noted. 2.  Degree of posterior disc bulging C3-C4 that evidence of any definite   neural impingement.                LABS    Labs Reviewed   TRAUMA PANEL - Abnormal; Notable for the following components:       Result Value    Ethanol 431 (*)     Ethanol percent 0.431 (*)     BUN 22 (*)     Hemoglobin 12.4 (*)     Hematocrit 37.5 (*)     MCV 80.8 (*)     RDW 15.3 (*)     Creatinine 0.64 (*)     Protime 13.4 (*)     pO2, Norm 61.7 (*)     HCO3, Venous 23.6 (*)     O2 Sat, Norm 86.1 (*)     All other components within normal limits   URINE DRUG SCREEN   URINALYSIS   TYPE AND SCREEN         Izabela Zuniga MD  10/15/22, 11:35 AM           Trauma Attending Suaznne Murillo      I have reviewed the above GCS note(s) and confirmed the key elements of the medical history and physical exam. I have seen and examined the pt. I have discussed the findings, established the care plan and recommendations with Resident.       Raul Hassan DO  10/18/2022  10:29 AM

## 2022-10-15 NOTE — ED NOTES
Liter of NS hung, verbal order from Dr Benito Patiño, [address] [place] [address]  10/15/22 1958

## 2022-10-15 NOTE — ED NOTES
Pt ripping off ECG leads and IV and wanting to leave  Resident made aware       Nigel Zurita, RN  10/15/22 3375

## 2022-10-15 NOTE — PROGRESS NOTES
[facility] CARE DEPARTMENT - [facility]     Emergency/Trauma Note    PATIENT NAME: Marin Will    Shift date: 10/15/22  Shift day: Saturday   Shift # 1    Room # 25/25   Name: Marin Will            Age: 46 y.o. Gender: male          Anabaptist: [employer]    Place of Nondenominational:      Trauma/Incident type: Adult Trauma Alert  Admit Date & Time: 10/15/2022  7:39 AM  TRAUMA NAME: n/a    ADVANCE DIRECTIVES IN CHART? No    NAME OF DECISION MAKER: Hayden Nelson    RELATIONSHIP OF DECISION MAKER TO PATIENT: spouse    PATIENT/EVENT DESCRIPTION:  Marin Will is a 46 y.o. male who arrived via from (scene/accident/event) as a trauma. Pt to be admitted to 25/25. SPIRITUAL ASSESSMENT-INTERVENTION-OUTCOME:  This writer met w/ pt in ED rm 25. Pt appeared to be somewhat confused and in shock. Pt stated that his wife should be on her way to hospital. This writer provided ministry of presence and comfort to pt. PATIENT BELONGINGS:  With patient    ANY BELONGINGS OF SIGNIFICANT VALUE NOTED:  N/a    REGISTRATION STAFF NOTIFIED? No      WHAT IS YOUR SPIRITUAL CARE PLAN FOR THIS PATIENT?:   [facility]  will remain available for follow up with patient and family as needed. Electronically signed by Frankie Dahl on 10/15/2022 at 9:33 AM.  Mark Griffin  [phone]         10/15/22 0932   Encounter Summary   Service Provided For: Patient   Referral/Consult From: Other    Support System Spouse   Last Encounter  10/15/22   Complexity of Encounter Moderate   Begin Time 0910   End Time  0920   Total Time Calculated 10 min   Encounter    Type Initial Screen/Assessment   Crisis   Type Trauma   Assessment/Intervention/Outcome   Assessment Anxious; Shock   Intervention Active listening;Sustaining Presence/Ministry of presence   Outcome Engaged in conversation;Comfort

## 2022-10-17 ENCOUNTER — OFFICE VISIT (OUTPATIENT)
Dept: PRIMARY CARE CLINIC | Age: 42
End: 2022-10-17
Payer: COMMERCIAL

## 2022-10-17 VITALS
WEIGHT: 189.2 LBS | HEART RATE: 83 BPM | DIASTOLIC BLOOD PRESSURE: 96 MMHG | BODY MASS INDEX: 23.52 KG/M2 | OXYGEN SATURATION: 98 % | SYSTOLIC BLOOD PRESSURE: 142 MMHG | HEIGHT: 75 IN

## 2022-10-17 DIAGNOSIS — R79.0 LOW MAGNESIUM LEVEL: ICD-10-CM

## 2022-10-17 DIAGNOSIS — D69.6 THROMBOCYTOPENIA (HCC): ICD-10-CM

## 2022-10-17 DIAGNOSIS — I82.452 ACUTE DEEP VEIN THROMBOSIS (DVT) OF LEFT PERONEAL VEIN (HCC): ICD-10-CM

## 2022-10-17 DIAGNOSIS — I82.462 ACUTE DEEP VEIN THROMBOSIS (DVT) OF CALF MUSCLE VEIN OF LEFT LOWER EXTREMITY (HCC): ICD-10-CM

## 2022-10-17 DIAGNOSIS — R04.0 EPISTAXIS: Primary | ICD-10-CM

## 2022-10-17 DIAGNOSIS — F10.10 ALCOHOL ABUSE: Chronic | ICD-10-CM

## 2022-10-17 DIAGNOSIS — Z86.73 HISTORY OF STROKE: ICD-10-CM

## 2022-10-17 PROCEDURE — 99214 OFFICE O/P EST MOD 30 MIN: CPT | Performed by: FAMILY MEDICINE

## 2022-10-17 RX ORDER — SODIUM CHLORIDE 3 %
2 AEROSOL, MIST NASAL 3 TIMES DAILY
Refills: 0 | COMMUNITY
Start: 2022-10-17

## 2022-10-17 RX ORDER — NALTREXONE 380 MG
380 KIT INTRAMUSCULAR ONCE
Qty: 1.2 EACH | Refills: 3 | Status: SHIPPED | OUTPATIENT
Start: 2022-10-17 | End: 2022-10-17

## 2022-10-17 SDOH — ECONOMIC STABILITY: FOOD INSECURITY: WITHIN THE PAST 12 MONTHS, YOU WORRIED THAT YOUR FOOD WOULD RUN OUT BEFORE YOU GOT MONEY TO BUY MORE.: NEVER TRUE

## 2022-10-17 SDOH — ECONOMIC STABILITY: FOOD INSECURITY: WITHIN THE PAST 12 MONTHS, THE FOOD YOU BOUGHT JUST DIDN'T LAST AND YOU DIDN'T HAVE MONEY TO GET MORE.: NEVER TRUE

## 2022-10-17 ASSESSMENT — PATIENT HEALTH QUESTIONNAIRE - PHQ9
SUM OF ALL RESPONSES TO PHQ QUESTIONS 1-9: 0
2. FEELING DOWN, DEPRESSED OR HOPELESS: 0
1. LITTLE INTEREST OR PLEASURE IN DOING THINGS: 0
SUM OF ALL RESPONSES TO PHQ QUESTIONS 1-9: 0
SUM OF ALL RESPONSES TO PHQ QUESTIONS 1-9: 0
SUM OF ALL RESPONSES TO PHQ9 QUESTIONS 1 & 2: 0
SUM OF ALL RESPONSES TO PHQ QUESTIONS 1-9: 0

## 2022-10-17 ASSESSMENT — SOCIAL DETERMINANTS OF HEALTH (SDOH): HOW HARD IS IT FOR YOU TO PAY FOR THE VERY BASICS LIKE FOOD, HOUSING, MEDICAL CARE, AND HEATING?: NOT HARD AT ALL

## 2022-10-17 NOTE — PROGRESS NOTES
[address] [facility] PRIMARY CARE  [address] [facility] [address]  Dept: Pr-2 George Narayan is a 46 y.o. male Established patient, who presents today for his medical conditions/complaintsas noted below. Chief Complaint   Patient presents with    ED Follow-up     Pt here today for ER  f/u from [facility]       HPI:     HPI    Some bad bloody noses since on blood thinner. He does admit to picking off the scab in the left nose and it bleeds more. Reviewed prior notes None  Reviewed previous Labs, Imaging, and Hospital Records    LDL Cholesterol (mg/dL)   Date Value   09/12/2014 111     LDL Calculated (mg/dL)   Date Value   04/09/2018 195 (A)       (goal LDL is <100)   AST (U/L)   Date Value   07/05/2021 288 (H)     ALT (U/L)   Date Value   07/05/2021 104 (H)     BUN (mg/dL)   Date Value   10/15/2022 22 (H)     TSH (mIU/L)   Date Value   06/22/2021 1.76     BP Readings from Last 3 Encounters:   10/17/22 (!) 142/96   10/15/22 124/89   10/04/22 129/87          (goal 120/80)    Past Medical History:   Diagnosis Date    Alcohol abuse 10/17/2022    Asthma     Essential hypertension 4/2/2018    High cholesterol 6/23/2018    Seasonal allergies       Past Surgical History:   Procedure Laterality Date    TRANSESOPHAGEAL ECHOCARDIOGRAM  9/13/14       Family History   Problem Relation Age of Onset    Stroke Maternal Grandmother         cousin    High Blood Pressure Maternal Grandmother     Cancer Other         G. Mother , Cervix    Alcohol Abuse Father         G. Parents, Uncles.        Social History     Tobacco Use    Smoking status: Never    Smokeless tobacco: Never   Substance Use Topics    Alcohol use: Yes     Comment: Pt states he last drank on 5/30/2021      Current Outpatient Medications   Medication Sig Dispense Refill    apixaban (ELIQUIS) 5 MG TABS tablet Take 1 tablet by mouth 2 times daily 60 tablet 3    naltrexone (VIVITROL) 380 MG injection Inject 380 mg into the muscle once for 1 dose 1.2 each 3    Elastic Bandages & Supports (MEDICAL COMPRESSION STOCKINGS) MISC 1 each by Does not apply route daily as needed (Left lower leg, knee high, compression 20-30 mm) 2 each 0    Saline (SIMPLY SALINE) 0.9 % AERS 2 sprays by Nasal route in the morning, at noon, and at bedtime  0    ibuprofen (ADVIL;MOTRIN) 800 MG tablet Take 1 tablet by mouth every 8 hours as needed for Pain 6 tablet 0    acetaminophen (TYLENOL) 500 MG tablet Take 2 tablets by mouth every 8 hours as needed for Pain 12 tablet 0    apixaban starter pack (ELIQUIS DVT/PE STARTER PACK) 5 MG TBPK tablet Take 1 tablet by mouth See Admin Instructions 74 tablet 0    metoprolol tartrate (LOPRESSOR) 25 MG tablet Take 0.5 tablets by mouth in the morning and 0.5 tablets before bedtime. 60 tablet 1    ibuprofen (ADVIL;MOTRIN) 600 MG tablet Take 1 tablet by mouth 3 times daily as needed for Pain 30 tablet 0    magnesium oxide (MAG-OX) 400 (241.3 Mg) MG TABS tablet Take 1 tablet by mouth 2 times daily 60 tablet 3    Potassium 99 MG TABS Take 99 mg by mouth as needed       cetirizine (ZYRTEC) 10 MG tablet Take 10 mg by mouth daily      albuterol sulfate HFA (PROAIR HFA) 108 (90 Base) MCG/ACT inhaler Inhale 2 puffs into the lungs every 6 hours as needed for Wheezing or Shortness of Breath 1 Inhaler 3     No current facility-administered medications for this visit.      No Known Allergies    Health Maintenance   Topic Date Due    COVID-19 Vaccine (1) Never done    Varicella vaccine (1 of 2 - 2-dose childhood series) Never done    Pneumococcal 0-64 years Vaccine (1 - PCV) Never done    HIV screen  Never done    DTaP/Tdap/Td vaccine (1 - Tdap) Never done    Depression Screen  07/08/2022    Flu vaccine (1) Never done    Hepatitis C screen  Completed    Hepatitis A vaccine  Aged Out    Hib vaccine  Aged Out    Meningococcal (ACWY) vaccine  Aged Out       Subjective:      Review of Systems  He is getting treatment at [facility] and plus going to a meeting. Objective:     BP (!) 142/96   Pulse 83   Ht 6' 3\" (1.905 m)   Wt 189 lb 3.2 oz (85.8 kg)   SpO2 98%   BMI 23.65 kg/m²   Physical Exam  Vitals and nursing note reviewed. Constitutional:       General: He is not in acute distress. Appearance: He is well-developed. He is not ill-appearing. HENT:      Head: Normocephalic and atraumatic. Right Ear: External ear normal.      Left Ear: External ear normal.      Nose:      Comments: Congested and red turbinates. The left lateral alar area does show a small yellow scab and a small spot of redness it looks like healing blood. Eyes:      General: No scleral icterus. Right eye: No discharge. Left eye: No discharge. Conjunctiva/sclera: Conjunctivae normal.   Neck:      Thyroid: No thyromegaly. Trachea: No tracheal deviation. Cardiovascular:      Rate and Rhythm: Normal rate and regular rhythm. Heart sounds: Normal heart sounds. Pulmonary:      Effort: Pulmonary effort is normal. No respiratory distress. Breath sounds: Normal breath sounds. No wheezing. Musculoskeletal:      Comments: Left lower leg is swollen and edematous. No redness. Lymphadenopathy:      Cervical: No cervical adenopathy. Skin:     General: Skin is warm. Findings: No rash. Neurological:      Mental Status: He is alert and oriented to person, place, and time. Psychiatric:         Mood and Affect: Mood normal.         Behavior: Behavior normal.         Thought Content: Thought content normal.       Assessment:       Diagnosis Orders   1. Epistaxis  Saline (SIMPLY SALINE) 0.9 % AERS      2. Acute deep vein thrombosis (DVT) of calf muscle vein of left lower extremity (HCC)  apixaban (ELIQUIS) 5 MG TABS tablet    Elastic Bandages & Supports (MEDICAL COMPRESSION STOCKINGS) MISC      3. History of stroke        4.  Acute deep vein thrombosis (DVT) of left peroneal vein (HCC)  apixaban (ELIQUIS) 5 MG TABS tablet Elastic Bandages & Supports (MEDICAL COMPRESSION STOCKINGS) MISC      5. Alcohol abuse  naltrexone (VIVITROL) 380 MG injection      6. Thrombocytopenia (HCC)  CBC with Auto Differential      7. Low magnesium level  Magnesium             Plan:      No follow-ups on file. Patient would like to try the Vivitrol shot to help him stop drinking. I will try sending it into his pharmacy and if his insurance covers it he can bring it in for us to give him the shot. He is getting treatment right now through [place] and an early bird meeting. He is not sure he would be compliant with pills. Use knee-high mends socks or use a compression sock for the left lower leg swelling. Use Neosporin or Vaseline on the small area of the scab in his nose so he stopped picking off the scab and continued bleeding. Recheck blood work next month. Recheck 1 month  Orders Placed This Encounter   Procedures    CBC with Auto Differential     Standing Status:   Future     Standing Expiration Date:   10/17/2023    Magnesium     Standing Status:   Future     Standing Expiration Date:   10/17/2023     Orders Placed This Encounter   Medications    apixaban (ELIQUIS) 5 MG TABS tablet     Sig: Take 1 tablet by mouth 2 times daily     Dispense:  60 tablet     Refill:  3    naltrexone (VIVITROL) 380 MG injection     Sig: Inject 380 mg into the muscle once for 1 dose     Dispense:  1.2 each     Refill:  3    Elastic Bandages & Supports (MEDICAL COMPRESSION STOCKINGS) MISC     Si each by Does not apply route daily as needed (Left lower leg, knee high, compression 20-30 mm)     Dispense:  2 each     Refill:  0    Saline (SIMPLY SALINE) 0.9 % AERS     Si sprays by Nasal route in the morning, at noon, and at bedtime     Refill:  0       Patient given educationalmaterials - see patient instructions. Discussed use, benefit, and side effectsof prescribed medications. All patient questions answered. Pt voiced understanding. Reviewed health maintenance. Instructed to continue current medications, diet andexercise. Patient agreed with treatment plan. Follow up as directed.      Electronicallysigned by Fortino Prado MD on 10/17/2022 at 12:06 PM

## 2022-11-07 ENCOUNTER — OFFICE VISIT (OUTPATIENT)
Dept: PRIMARY CARE CLINIC | Age: 42
End: 2022-11-07
Payer: COMMERCIAL

## 2022-11-07 VITALS
OXYGEN SATURATION: 99 % | SYSTOLIC BLOOD PRESSURE: 140 MMHG | BODY MASS INDEX: 24.12 KG/M2 | HEIGHT: 75 IN | WEIGHT: 194 LBS | HEART RATE: 68 BPM | DIASTOLIC BLOOD PRESSURE: 94 MMHG

## 2022-11-07 DIAGNOSIS — I82.462 ACUTE DEEP VEIN THROMBOSIS (DVT) OF CALF MUSCLE VEIN OF LEFT LOWER EXTREMITY (HCC): ICD-10-CM

## 2022-11-07 DIAGNOSIS — I10 ESSENTIAL HYPERTENSION: Primary | ICD-10-CM

## 2022-11-07 DIAGNOSIS — F10.10 ALCOHOL ABUSE: ICD-10-CM

## 2022-11-07 PROCEDURE — 3074F SYST BP LT 130 MM HG: CPT | Performed by: FAMILY MEDICINE

## 2022-11-07 PROCEDURE — 99213 OFFICE O/P EST LOW 20 MIN: CPT | Performed by: FAMILY MEDICINE

## 2022-11-07 PROCEDURE — 3078F DIAST BP <80 MM HG: CPT | Performed by: FAMILY MEDICINE

## 2022-11-07 RX ORDER — NALTREXONE HYDROCHLORIDE 50 MG/1
50 TABLET, FILM COATED ORAL DAILY
Qty: 30 TABLET | Refills: 3 | Status: SHIPPED | OUTPATIENT
Start: 2022-11-07 | End: 2022-12-07

## 2022-11-07 NOTE — PROGRESS NOTES
[address] [facility] PRIMARY CARE  [address] [facility] [address]  Dept: Pr-2 George Narayan is a 46 y.o. male Established patient, who presents today for his medical conditions/complaintsas noted below. Chief Complaint   Patient presents with    Discuss Labs     DVT f/u . Patient did not have labs done. Patient said he will have labs done today        HPI:     HPI  DVT 10/3/22  No more nose bleeds  Works 12 hrs a day   He likes to run. Still going to [employer]. He has been forgetting to take his metoprolol pill. He is taking his blood thinner pill twice a day. The Vivitrol shot was not covered as under his insurance and he would like to try the naltrexone tablet daily. Reviewed prior notes None  Reviewed previous Labs    LDL Cholesterol (mg/dL)   Date Value   09/12/2014 111     LDL Calculated (mg/dL)   Date Value   04/09/2018 195 (A)       (goal LDL is <100)   AST (U/L)   Date Value   07/05/2021 288 (H)     ALT (U/L)   Date Value   07/05/2021 104 (H)     BUN (mg/dL)   Date Value   10/15/2022 22 (H)     TSH (mIU/L)   Date Value   06/22/2021 1.76     BP Readings from Last 3 Encounters:   11/07/22 (!) 140/94   10/17/22 (!) 142/96   10/15/22 124/89          (goal 120/80)    Past Medical History:   Diagnosis Date    Alcohol abuse 10/17/2022    Asthma     Essential hypertension 4/2/2018    High cholesterol 6/23/2018    Seasonal allergies       Past Surgical History:   Procedure Laterality Date    TRANSESOPHAGEAL ECHOCARDIOGRAM  9/13/14       Family History   Problem Relation Age of Onset    Stroke Maternal Grandmother         cousin    High Blood Pressure Maternal Grandmother     Cancer Other         G. Mother , Cervix    Alcohol Abuse Father         G. Parents, Uncles.        Social History     Tobacco Use    Smoking status: Never    Smokeless tobacco: Never   Substance Use Topics    Alcohol use: Yes     Comment: Pt states he last drank on 5/30/2021 Current Outpatient Medications   Medication Sig Dispense Refill    naltrexone (DEPADE) 50 MG tablet Take 1 tablet by mouth daily 30 tablet 3    apixaban (ELIQUIS) 5 MG TABS tablet Take 1 tablet by mouth 2 times daily 60 tablet 3    Saline (SIMPLY SALINE) 0.9 % AERS 2 sprays by Nasal route in the morning, at noon, and at bedtime  0    metoprolol tartrate (LOPRESSOR) 25 MG tablet Take 0.5 tablets by mouth in the morning and 0.5 tablets before bedtime. 60 tablet 1    magnesium oxide (MAG-OX) 400 (241.3 Mg) MG TABS tablet Take 1 tablet by mouth 2 times daily 60 tablet 3    cetirizine (ZYRTEC) 10 MG tablet Take 10 mg by mouth daily      albuterol sulfate HFA (PROAIR HFA) 108 (90 Base) MCG/ACT inhaler Inhale 2 puffs into the lungs every 6 hours as needed for Wheezing or Shortness of Breath 1 Inhaler 3    Elastic Bandages & Supports (MEDICAL COMPRESSION STOCKINGS) MISC 1 each by Does not apply route daily as needed (Left lower leg, knee high, compression 20-30 mm) 2 each 0    ibuprofen (ADVIL;MOTRIN) 800 MG tablet Take 1 tablet by mouth every 8 hours as needed for Pain 6 tablet 0     No current facility-administered medications for this visit. No Known Allergies    Health Maintenance   Topic Date Due    COVID-19 Vaccine (1) Never done    Varicella vaccine (1 of 2 - 2-dose childhood series) Never done    Pneumococcal 0-64 years Vaccine (1 - PCV) Never done    HIV screen  Never done    DTaP/Tdap/Td vaccine (1 - Tdap) Never done    Flu vaccine (1) Never done    Depression Screen  10/17/2023    Hepatitis C screen  Completed    Hepatitis A vaccine  Aged Out    Hib vaccine  Aged Out    Meningococcal (ACWY) vaccine  Aged Out       Subjective:      Review of Systems   Constitutional: Negative. Objective:     BP (!) 140/94   Pulse 68   Ht 6' 3\" (1.905 m)   Wt 194 lb (88 kg)   SpO2 99%   BMI 24.25 kg/m²   Physical Exam  Vitals and nursing note reviewed. Constitutional:       Appearance: Normal appearance.    HENT: Head: Normocephalic and atraumatic. Musculoskeletal:      Comments: Left calf swollen, not tender, softer, still larger than right. Neurological:      Mental Status: He is alert and oriented to person, place, and time. Psychiatric:         Mood and Affect: Mood normal.         Behavior: Behavior normal.         Thought Content: Thought content normal.       Assessment:       Diagnosis Orders   1. Essential hypertension        2. Acute deep vein thrombosis (DVT) of calf muscle vein of left lower extremity (HCC)        3. Alcohol abuse  naltrexone (DEPADE) 50 MG tablet           Plan:      Return in about 6 weeks (around 12/19/2022) for blood thinner check. med check BP check. .  Declines flu shot  No orders of the defined types were placed in this encounter. Orders Placed This Encounter   Medications    naltrexone (DEPADE) 50 MG tablet     Sig: Take 1 tablet by mouth daily     Dispense:  30 tablet     Refill:  3       Patient given educationalmaterials - see patient instructions. Discussed use, benefit, and side effectsof prescribed medications. All patient questions answered. Pt voiced understanding. Reviewed health maintenance. Instructed to continue current medications, diet andexercise. Patient agreed with treatment plan. Follow up as directed.      Electronicallysigned by Angel Luis Peck MD on 11/7/2022 at 9:56 AM

## 2023-01-06 ENCOUNTER — TELEPHONE (OUTPATIENT)
Dept: PRIMARY CARE CLINIC | Age: 43
End: 2023-01-06

## 2023-01-06 NOTE — TELEPHONE ENCOUNTER
Just an FYI. Pt was admitted to [facility] this am and had emergency surgery. Fasciotomy of his right thigh.

## 2023-02-23 ENCOUNTER — TELEPHONE (OUTPATIENT)
Dept: PRIMARY CARE CLINIC | Age: 43
End: 2023-02-23

## 2023-02-23 NOTE — TELEPHONE ENCOUNTER
[address] [facility] care called office states patient was admitted and will be started on IV antibiotics for 5 days     PT/OT will visit later this week.      Any questions please contact Cristhian Diehl

## 2023-04-14 PROBLEM — T14.8XXA BRUISING: Status: ACTIVE | Noted: 2023-04-14

## 2023-04-14 PROBLEM — S00.522A: Status: ACTIVE | Noted: 2023-04-14

## 2023-06-13 PROBLEM — T79.A0XA COMPARTMENT SYNDROME (HCC): Status: ACTIVE | Noted: 2023-01-06

## 2023-06-13 PROBLEM — T79.A21A TRAUMATIC COMPARTMENT SYNDROME OF RIGHT LOWER EXTREMITY (HCC): Status: ACTIVE | Noted: 2023-01-06

## 2023-06-13 PROBLEM — L02.415 ABSCESS OF RIGHT LEG: Status: ACTIVE | Noted: 2023-02-14

## 2023-06-13 PROBLEM — I82.409 DEEP VEIN THROMBOSIS (DVT) OF LOWER EXTREMITY (HCC): Status: ACTIVE | Noted: 2023-06-13

## 2023-09-07 ENCOUNTER — TELEPHONE (OUTPATIENT)
Dept: PRIMARY CARE CLINIC | Age: 43
End: 2023-09-07

## 2023-09-07 NOTE — TELEPHONE ENCOUNTER
Patient called c/o shoulder and neck pain, he is scheduled with  tomorrow morning. He also mentioned swelling in left lower leg started Monday, he said he is concerned due to having a blood clot in the same leg a year ago. Patient denied any pain and said swelling has gone down. Patient said leg also feels a little warm. Patient asking if he is okay to wait until tomorrow morning.

## 2023-11-07 ENCOUNTER — APPOINTMENT (OUTPATIENT)
Dept: VASCULAR LAB | Age: 43
End: 2023-11-07
Attending: EMERGENCY MEDICINE
Payer: COMMERCIAL

## 2023-11-07 ENCOUNTER — HOSPITAL ENCOUNTER (EMERGENCY)
Age: 43
Discharge: HOME | End: 2023-11-07
Attending: EMERGENCY MEDICINE
Payer: COMMERCIAL

## 2023-11-07 VITALS
HEIGHT: 74 IN | SYSTOLIC BLOOD PRESSURE: 144 MMHG | WEIGHT: 172 LBS | RESPIRATION RATE: 16 BRPM | DIASTOLIC BLOOD PRESSURE: 97 MMHG | BODY MASS INDEX: 22.07 KG/M2 | TEMPERATURE: 98.4 F | HEART RATE: 111 BPM | OXYGEN SATURATION: 96 %

## 2023-11-07 DIAGNOSIS — M79.89 RIGHT LEG SWELLING: ICD-10-CM

## 2023-11-07 DIAGNOSIS — S01.81XA CHIN LACERATION, INITIAL ENCOUNTER: Primary | ICD-10-CM

## 2023-11-07 LAB
ANION GAP SERPL CALCULATED.3IONS-SCNC: 18 MMOL/L (ref 9–17)
BASOPHILS # BLD: 0.1 K/UL (ref 0–0.2)
BASOPHILS NFR BLD: 2 % (ref 0–2)
BUN SERPL-MCNC: 9 MG/DL (ref 6–20)
CALCIUM SERPL-MCNC: 8.8 MG/DL (ref 8.6–10.4)
CHLORIDE SERPL-SCNC: 99 MMOL/L (ref 98–107)
CO2 SERPL-SCNC: 20 MMOL/L (ref 20–31)
CREAT SERPL-MCNC: 0.7 MG/DL (ref 0.7–1.2)
D DIMER PPP FEU-MCNC: 3.93 UG/ML FEU (ref 0–0.59)
ECHO BSA: 2.02 M2
EOSINOPHIL # BLD: 0 K/UL (ref 0–0.4)
EOSINOPHILS RELATIVE PERCENT: 1 % (ref 0–4)
ERYTHROCYTE [DISTWIDTH] IN BLOOD BY AUTOMATED COUNT: 18.2 % (ref 11.5–14.9)
GFR SERPL CREATININE-BSD FRML MDRD: >60 ML/MIN/1.73M2
GLUCOSE SERPL-MCNC: 127 MG/DL (ref 70–99)
HCT VFR BLD AUTO: 37 % (ref 41–53)
HGB BLD-MCNC: 12.1 G/DL (ref 13.5–17.5)
INR PPP: 1.2
LYMPHOCYTES NFR BLD: 1.2 K/UL (ref 1–4.8)
LYMPHOCYTES RELATIVE PERCENT: 31 % (ref 24–44)
MCH RBC QN AUTO: 26.6 PG (ref 26–34)
MCHC RBC AUTO-ENTMCNC: 32.6 G/DL (ref 31–37)
MCV RBC AUTO: 81.4 FL (ref 80–100)
MONOCYTES NFR BLD: 0.4 K/UL (ref 0.1–1.3)
MONOCYTES NFR BLD: 10 % (ref 1–7)
NEUTROPHILS NFR BLD: 56 % (ref 36–66)
NEUTS SEG NFR BLD: 2.1 K/UL (ref 1.3–9.1)
PARTIAL THROMBOPLASTIN TIME: 28.8 SEC (ref 24–36)
PLATELET # BLD AUTO: 235 K/UL (ref 150–450)
PMV BLD AUTO: 6.5 FL (ref 6–12)
POTASSIUM SERPL-SCNC: 4.1 MMOL/L (ref 3.7–5.3)
PROTHROMBIN TIME: 15.1 SEC (ref 11.8–14.6)
RBC # BLD AUTO: 4.54 M/UL (ref 4.5–5.9)
SODIUM SERPL-SCNC: 137 MMOL/L (ref 135–144)
WBC OTHER # BLD: 3.7 K/UL (ref 3.5–11)

## 2023-11-07 PROCEDURE — 36415 COLL VENOUS BLD VENIPUNCTURE: CPT

## 2023-11-07 PROCEDURE — 85379 FIBRIN DEGRADATION QUANT: CPT

## 2023-11-07 PROCEDURE — 85730 THROMBOPLASTIN TIME PARTIAL: CPT

## 2023-11-07 PROCEDURE — 85610 PROTHROMBIN TIME: CPT

## 2023-11-07 PROCEDURE — 93971 EXTREMITY STUDY: CPT

## 2023-11-07 PROCEDURE — 99284 EMERGENCY DEPT VISIT MOD MDM: CPT

## 2023-11-07 PROCEDURE — 80048 BASIC METABOLIC PNL TOTAL CA: CPT

## 2023-11-07 PROCEDURE — 93971 EXTREMITY STUDY: CPT | Performed by: STUDENT IN AN ORGANIZED HEALTH CARE EDUCATION/TRAINING PROGRAM

## 2023-11-07 PROCEDURE — 2500000003 HC RX 250 WO HCPCS: Performed by: EMERGENCY MEDICINE

## 2023-11-07 PROCEDURE — 12014 RPR F/E/E/N/L/M 5.1-7.5 CM: CPT

## 2023-11-07 PROCEDURE — 85025 COMPLETE CBC W/AUTO DIFF WBC: CPT

## 2023-11-07 RX ORDER — LIDOCAINE HYDROCHLORIDE 10 MG/ML
5 INJECTION, SOLUTION INFILTRATION; PERINEURAL ONCE
Status: COMPLETED | OUTPATIENT
Start: 2023-11-07 | End: 2023-11-07

## 2023-11-07 RX ADMIN — LIDOCAINE HYDROCHLORIDE 5 ML: 10 INJECTION, SOLUTION INFILTRATION; PERINEURAL at 12:56

## 2023-11-07 ASSESSMENT — ENCOUNTER SYMPTOMS
DIARRHEA: 0
NAUSEA: 0
PHOTOPHOBIA: 0
TROUBLE SWALLOWING: 0
COUGH: 0
COLOR CHANGE: 0
ABDOMINAL PAIN: 0
SHORTNESS OF BREATH: 0
VOMITING: 0

## 2023-11-07 ASSESSMENT — PAIN SCALES - GENERAL: PAINLEVEL_OUTOF10: 8

## 2023-11-07 ASSESSMENT — PATIENT HEALTH QUESTIONNAIRE - PHQ9
SUM OF ALL RESPONSES TO PHQ QUESTIONS 1-9: 0
2. FEELING DOWN, DEPRESSED OR HOPELESS: 0
SUM OF ALL RESPONSES TO PHQ9 QUESTIONS 1 & 2: 0
SUM OF ALL RESPONSES TO PHQ QUESTIONS 1-9: 0
SUM OF ALL RESPONSES TO PHQ QUESTIONS 1-9: 0
1. LITTLE INTEREST OR PLEASURE IN DOING THINGS: 0
SUM OF ALL RESPONSES TO PHQ QUESTIONS 1-9: 0

## 2023-11-07 ASSESSMENT — LIFESTYLE VARIABLES
HOW OFTEN DO YOU HAVE A DRINK CONTAINING ALCOHOL: NEVER
HOW MANY STANDARD DRINKS CONTAINING ALCOHOL DO YOU HAVE ON A TYPICAL DAY: PATIENT DOES NOT DRINK

## 2023-11-07 ASSESSMENT — PAIN - FUNCTIONAL ASSESSMENT: PAIN_FUNCTIONAL_ASSESSMENT: 0-10

## 2023-11-07 NOTE — ED NOTES
Writer called vascular for results of scan. Writer was told they would look into it. Vascular called back to state that preliminary results are on pt's clipboard. Dr. Michell Turcios was notified. Pt was informed.      Gabrielle Wilkerson, RN  11/07/23 [id number]

## 2023-11-07 NOTE — ED PROVIDER NOTES
EMERGENCY DEPARTMENT ENCOUNTER    Pt Name: Alonzo Fermin  MRN: [id number]  [address] [place] 1980  Date of evaluation: 11/7/23  CHIEF COMPLAINT       Chief Complaint   Patient presents with    Laceration     Work injury laceration under his chin     HISTORY OF PRESENT ILLNESS   27-year-old male presenting to the ER for a laceration to the chin that happened earlier today at work. Patient was working with a plastic palate that ended up hitting him under the chin and caused a cut. The patient is also complaining of right lower extremity swelling and pain for the last 2 months. Patient does admit to an underlying history of a DVT that he was on Eliquis for that he stopped taking on his own accord roughly 9 months ago. Patient has not yet seen a physician for the right lower extremity swelling. The history is provided by the patient. Laceration  Location:  Face  Facial laceration location:  Chin  Length:  8cm  Associated symptoms: no fever and no rash            REVIEW OF SYSTEMS     Review of Systems   Constitutional:  Negative for activity change, fatigue and fever. HENT:  Negative for congestion, ear pain and trouble swallowing. Eyes:  Negative for photophobia and visual disturbance. Respiratory:  Negative for cough and shortness of breath. Cardiovascular:  Positive for leg swelling (RLE). Negative for chest pain and palpitations. Gastrointestinal:  Negative for abdominal pain, diarrhea, nausea and vomiting. Genitourinary:  Negative for dysuria, flank pain and urgency. Musculoskeletal:  Negative for arthralgias and myalgias. Skin:  Positive for wound (laceration chin). Negative for color change and rash. Neurological:  Negative for dizziness and facial asymmetry. Psychiatric/Behavioral:  Negative for agitation and behavioral problems.       PASTMEDICAL HISTORY     Past Medical History:   Diagnosis Date    Alcohol abuse 10/17/2022    Asthma     Essential hypertension 4/2/2018    High

## 2023-11-07 NOTE — ED NOTES
Upon rounding, pt states he would like to leave and get results at home. Writer encouraged pt to stay and wait for results.      Socrates Jaime Virginia  11/07/23 2371

## 2023-11-15 ENCOUNTER — TELEPHONE (OUTPATIENT)
Dept: PRIMARY CARE CLINIC | Age: 43
End: 2023-11-15

## 2023-11-15 NOTE — TELEPHONE ENCOUNTER
Daughter Joel Post concerned with pt's severe alcoholism. Not eating, not drinking water etc just alcohol, keeps getting hurt, losing weight. States parents got . Asking that we assist him with getting to rehab. I spoke with Dr. Alexa Bolden who advised of some facilities that can help her. Daughter in not on HIPAA and at no time was any information disclosed to her.

## 2023-11-21 ENCOUNTER — OFFICE VISIT (OUTPATIENT)
Dept: PRIMARY CARE CLINIC | Age: 43
End: 2023-11-21

## 2023-11-21 VITALS
BODY MASS INDEX: 23.1 KG/M2 | HEIGHT: 74 IN | SYSTOLIC BLOOD PRESSURE: 136 MMHG | DIASTOLIC BLOOD PRESSURE: 82 MMHG | HEART RATE: 120 BPM | WEIGHT: 180 LBS | OXYGEN SATURATION: 98 %

## 2023-11-21 DIAGNOSIS — S01.81XD CHIN LACERATION, SUBSEQUENT ENCOUNTER: Primary | ICD-10-CM

## 2023-11-21 PROCEDURE — 99024 POSTOP FOLLOW-UP VISIT: CPT | Performed by: STUDENT IN AN ORGANIZED HEALTH CARE EDUCATION/TRAINING PROGRAM

## 2023-11-21 RX ORDER — NALTREXONE 380 MG
KIT INTRAMUSCULAR
COMMUNITY
Start: 2023-08-31

## 2023-11-21 ASSESSMENT — ENCOUNTER SYMPTOMS: SHORTNESS OF BREATH: 0

## 2023-11-21 NOTE — PROGRESS NOTES
appeared to be small. No signs of infection. Laceration was well healed. Advised patient that he can remove the final suture when he is ready by shaving or cutting off the knot. He was also offered the option of returning once the scab had fallen off, but he declined. Plan:   5 of the 6 identified sutures were removed. The 7th was not identified during exam, complicated by surrounding hair growth and intolerance to procedure. Return if symptoms worsen or fail to improve. No orders of the defined types were placed in this encounter. No orders of the defined types were placed in this encounter. Discussed risks and benefits of above plan. All patient questions were answered prior to patient leaving the office, today. Reviewed health maintenance. Instructed regarding current medications, diet, and exercise. Patient agreed with treatment plan.     Electronically signed by Duy Gallegos DO on 11/21/2023 at 4:03 PM

## 2024-04-14 ENCOUNTER — APPOINTMENT (OUTPATIENT)
Dept: GENERAL RADIOLOGY | Age: 44
End: 2024-04-14

## 2024-04-14 ENCOUNTER — APPOINTMENT (OUTPATIENT)
Dept: CT IMAGING | Age: 44
End: 2024-04-14

## 2024-04-14 ENCOUNTER — HOSPITAL ENCOUNTER (EMERGENCY)
Age: 44
Discharge: LEFT BEFORE DISCHARGE | End: 2024-04-14
Attending: EMERGENCY MEDICINE

## 2024-04-14 VITALS
SYSTOLIC BLOOD PRESSURE: 129 MMHG | RESPIRATION RATE: 20 BRPM | WEIGHT: 170 LBS | DIASTOLIC BLOOD PRESSURE: 89 MMHG | HEART RATE: 123 BPM | BODY MASS INDEX: 21.83 KG/M2 | OXYGEN SATURATION: 95 % | TEMPERATURE: 98.4 F

## 2024-04-14 DIAGNOSIS — S09.90XA INJURY OF HEAD, INITIAL ENCOUNTER: Primary | ICD-10-CM

## 2024-04-14 DIAGNOSIS — F10.920 ACUTE ALCOHOLIC INTOXICATION WITHOUT COMPLICATION (HCC): ICD-10-CM

## 2024-04-14 LAB
ANION GAP SERPL CALCULATED.3IONS-SCNC: 23 MMOL/L (ref 9–17)
BASOPHILS # BLD: 0.08 K/UL (ref 0–0.2)
BASOPHILS NFR BLD: 2 % (ref 0–2)
BUN SERPL-MCNC: 5 MG/DL (ref 6–20)
CALCIUM SERPL-MCNC: 8.7 MG/DL (ref 8.6–10.4)
CHLORIDE SERPL-SCNC: 99 MMOL/L (ref 98–107)
CK SERPL-CCNC: 1188 U/L (ref 39–308)
CO2 SERPL-SCNC: 21 MMOL/L (ref 20–31)
CREAT SERPL-MCNC: 0.7 MG/DL (ref 0.7–1.2)
EOSINOPHIL # BLD: 0.08 K/UL (ref 0–0.4)
EOSINOPHILS RELATIVE PERCENT: 2 % (ref 0–4)
ERYTHROCYTE [DISTWIDTH] IN BLOOD BY AUTOMATED COUNT: 16.6 % (ref 11.5–14.9)
ETHANOL PERCENT: 0.36 %
ETHANOLAMINE SERPL-MCNC: 363 MG/DL
GFR SERPL CREATININE-BSD FRML MDRD: >90 ML/MIN/1.73M2
GLUCOSE SERPL-MCNC: 113 MG/DL (ref 70–99)
HCT VFR BLD AUTO: 37.8 % (ref 41–53)
HGB BLD-MCNC: 12.1 G/DL (ref 13.5–17.5)
LYMPHOCYTES NFR BLD: 2.02 K/UL (ref 1–4.8)
LYMPHOCYTES RELATIVE PERCENT: 48 % (ref 24–44)
MCH RBC QN AUTO: 26.5 PG (ref 26–34)
MCHC RBC AUTO-ENTMCNC: 32 G/DL (ref 31–37)
MCV RBC AUTO: 82.8 FL (ref 80–100)
MONOCYTES NFR BLD: 0.21 K/UL (ref 0.1–1.3)
MONOCYTES NFR BLD: 5 % (ref 1–7)
MORPHOLOGY: ABNORMAL
MORPHOLOGY: ABNORMAL
MYOGLOBIN SERPL-MCNC: 90 NG/ML (ref 28–72)
NEUTROPHILS NFR BLD: 43 % (ref 36–66)
NEUTS SEG NFR BLD: 1.81 K/UL (ref 1.3–9.1)
PLATELET # BLD AUTO: 134 K/UL (ref 150–450)
PMV BLD AUTO: 7.9 FL (ref 6–12)
POTASSIUM SERPL-SCNC: 3.6 MMOL/L (ref 3.7–5.3)
RBC # BLD AUTO: 4.57 M/UL (ref 4.5–5.9)
SODIUM SERPL-SCNC: 143 MMOL/L (ref 135–144)
WBC OTHER # BLD: 4.2 K/UL (ref 3.5–11)

## 2024-04-14 PROCEDURE — 2500000003 HC RX 250 WO HCPCS: Performed by: EMERGENCY MEDICINE

## 2024-04-14 PROCEDURE — 99284 EMERGENCY DEPT VISIT MOD MDM: CPT

## 2024-04-14 PROCEDURE — 73030 X-RAY EXAM OF SHOULDER: CPT

## 2024-04-14 PROCEDURE — 96374 THER/PROPH/DIAG INJ IV PUSH: CPT

## 2024-04-14 PROCEDURE — 85025 COMPLETE CBC W/AUTO DIFF WBC: CPT

## 2024-04-14 PROCEDURE — 12013 RPR F/E/E/N/L/M 2.6-5.0 CM: CPT

## 2024-04-14 PROCEDURE — 70486 CT MAXILLOFACIAL W/O DYE: CPT

## 2024-04-14 PROCEDURE — 2580000003 HC RX 258: Performed by: EMERGENCY MEDICINE

## 2024-04-14 PROCEDURE — 70450 CT HEAD/BRAIN W/O DYE: CPT

## 2024-04-14 PROCEDURE — 6360000002 HC RX W HCPCS: Performed by: EMERGENCY MEDICINE

## 2024-04-14 PROCEDURE — 72125 CT NECK SPINE W/O DYE: CPT

## 2024-04-14 PROCEDURE — 36415 COLL VENOUS BLD VENIPUNCTURE: CPT

## 2024-04-14 PROCEDURE — 80048 BASIC METABOLIC PNL TOTAL CA: CPT

## 2024-04-14 PROCEDURE — G0480 DRUG TEST DEF 1-7 CLASSES: HCPCS

## 2024-04-14 PROCEDURE — 96375 TX/PRO/DX INJ NEW DRUG ADDON: CPT

## 2024-04-14 PROCEDURE — 82550 ASSAY OF CK (CPK): CPT

## 2024-04-14 PROCEDURE — 83874 ASSAY OF MYOGLOBIN: CPT

## 2024-04-14 RX ORDER — LIDOCAINE HYDROCHLORIDE 10 MG/ML
5 INJECTION, SOLUTION INFILTRATION; PERINEURAL ONCE
Status: COMPLETED | OUTPATIENT
Start: 2024-04-14 | End: 2024-04-14

## 2024-04-14 RX ORDER — KETOROLAC TROMETHAMINE 30 MG/ML
15 INJECTION, SOLUTION INTRAMUSCULAR; INTRAVENOUS ONCE
Status: COMPLETED | OUTPATIENT
Start: 2024-04-14 | End: 2024-04-14

## 2024-04-14 RX ORDER — LORAZEPAM 2 MG/ML
0.5 INJECTION INTRAMUSCULAR ONCE
Status: COMPLETED | OUTPATIENT
Start: 2024-04-14 | End: 2024-04-14

## 2024-04-14 RX ORDER — IPRATROPIUM BROMIDE AND ALBUTEROL SULFATE 2.5; .5 MG/3ML; MG/3ML
1 SOLUTION RESPIRATORY (INHALATION) ONCE
Status: DISCONTINUED | OUTPATIENT
Start: 2024-04-14 | End: 2024-04-15 | Stop reason: HOSPADM

## 2024-04-14 RX ORDER — TRANEXAMIC ACID 100 MG/ML
1000 INJECTION, SOLUTION INTRAVENOUS ONCE
Status: COMPLETED | OUTPATIENT
Start: 2024-04-14 | End: 2024-04-14

## 2024-04-14 RX ORDER — 0.9 % SODIUM CHLORIDE 0.9 %
1000 INTRAVENOUS SOLUTION INTRAVENOUS ONCE
Status: COMPLETED | OUTPATIENT
Start: 2024-04-14 | End: 2024-04-14

## 2024-04-14 RX ADMIN — LIDOCAINE HYDROCHLORIDE 5 ML: 10 INJECTION, SOLUTION INFILTRATION; PERINEURAL at 17:37

## 2024-04-14 RX ADMIN — SODIUM CHLORIDE 1000 ML: 9 INJECTION, SOLUTION INTRAVENOUS at 17:28

## 2024-04-14 RX ADMIN — TRANEXAMIC ACID 1000 MG: 100 INJECTION, SOLUTION INTRAVENOUS at 20:41

## 2024-04-14 RX ADMIN — LORAZEPAM 0.5 MG: 2 INJECTION, SOLUTION INTRAMUSCULAR; INTRAVENOUS at 19:26

## 2024-04-14 RX ADMIN — KETOROLAC TROMETHAMINE 15 MG: 30 INJECTION, SOLUTION INTRAMUSCULAR at 19:27

## 2024-04-14 ASSESSMENT — PAIN DESCRIPTION - ORIENTATION
ORIENTATION: RIGHT
ORIENTATION: RIGHT

## 2024-04-14 ASSESSMENT — LIFESTYLE VARIABLES
HOW OFTEN DO YOU HAVE A DRINK CONTAINING ALCOHOL: 4 OR MORE TIMES A WEEK
HOW MANY STANDARD DRINKS CONTAINING ALCOHOL DO YOU HAVE ON A TYPICAL DAY: 7 TO 9

## 2024-04-14 ASSESSMENT — PAIN - FUNCTIONAL ASSESSMENT: PAIN_FUNCTIONAL_ASSESSMENT: 0-10

## 2024-04-14 ASSESSMENT — PAIN DESCRIPTION - PAIN TYPE: TYPE: ACUTE PAIN

## 2024-04-14 ASSESSMENT — PAIN DESCRIPTION - DESCRIPTORS
DESCRIPTORS: ACHING;BURNING
DESCRIPTORS: ACHING

## 2024-04-14 ASSESSMENT — PAIN SCALES - GENERAL
PAINLEVEL_OUTOF10: 10
PAINLEVEL_OUTOF10: 8

## 2024-04-14 ASSESSMENT — PAIN DESCRIPTION - LOCATION
LOCATION: MOUTH;ARM
LOCATION: SHOULDER

## 2024-04-14 NOTE — ED TRIAGE NOTES
Pt states he fell last night or early this morning in a hole in the road. Daughter found him laying outside today. Pt has trauma to his face, laceration to his chin, swelling of left eye, ecchymosis of left orbit and a severely swollen lip.

## 2024-04-14 NOTE — ED PROVIDER NOTES
[facility] ED  Emergency Department Encounter  Emergency Medicine Resident     Pt Name:Tyrese Nelson  MRN: [id number]  Birthdate 1980  Date of evaluation: 4/14/24  PCP:  Sravanthi Hillman MD  Note Started: 5:12 PM EDT      CHIEF COMPLAINT       Chief Complaint   Patient presents with    Fall    Facial Injury       HISTORY OF PRESENT ILLNESS  (Location/Symptom, Timing/Onset, Context/Setting, Quality, Duration, Modifying Factors, Severity.)      Tyrese Nelson is a 43 y.o. male who presents with fall reportedly while running earlier this morning.  Patient states he stepped in a pothole and fell forward.  Was found down by his daughter in a parking lot.  Patient reports he has had no alcohol today.  Patient with tangential thought, occasionally with significant agitation.  Limited history provided by patient.  States he has not had anything to drink for at least 2 days    PAST MEDICAL / SURGICAL / SOCIAL / FAMILY HISTORY      has a past medical history of Alcohol abuse, Asthma, Essential hypertension, High cholesterol, and Seasonal allergies.       has a past surgical history that includes transesophageal echocardiogram (9/13/14).      Social History     Socioeconomic History    Marital status: Legally      Spouse name: Not on file    Number of children: Not on file    Years of education: Not on file    Highest education level: Not on file   Occupational History    Not on file   Tobacco Use    Smoking status: Never    Smokeless tobacco: Never   Substance and Sexual Activity    Alcohol use: Yes     Comment: hx of alcohol abuse    Drug use: No    Sexual activity: Not on file   Other Topics Concern    Not on file   Social History Narrative    Not on file     Social Determinants of Health     Financial Resource Strain: Low Risk  (10/17/2022)    Overall Financial Resource Strain (CARDIA)     Difficulty of Paying Living Expenses: Not hard at all   Food Insecurity: No Food Insecurity

## 2024-04-14 NOTE — ED PROVIDER NOTES
[facility] ED  eMERGENCY dEPARTMENT eNCOUnter   Attending Attestation     Pt Name: Tyrese Nelson  MRN: [id number]  Birthdate 1980  Date of evaluation: 4/14/24       Tyrese Nelson is a 43 y.o. male who presents with Fall and Facial Injury      History:   Patient was running this morning and tripped in a pothole and fell fell flat on his face and evidently has been out for multiple hours until he was found by somebody and helped him up.  Patient has a very fat lip a lot of pain to his face and right shoulder.  Patient is not sure how long he was down for.    Exam: Vitals:   Vitals:    04/14/24 1657 04/14/24 1700   BP: (!) 156/115    Pulse: (!) 153    Resp: 18    Temp:  98.4 °F (36.9 °C)   SpO2: 95%    Weight: 77.1 kg (170 lb)      Large lower lip swelling with lacerations pain and decreased range of motion of the right shoulder.  Neurologically intact.    I performed a history and physical examination of the patient and discussed management with the resident. I reviewed the resident’s note and agree with the documented findings and plan of care. Any areas of disagreement are noted on the chart. I was personally present for the key portions of any procedures. I have documented in the chart those procedures where I was not present during the key portions. I have personally reviewed all images and agree with the resident's interpretation. I have reviewed the emergency nurses triage note. I agree with the chief complaint, past medical history, past surgical history, allergies, medications, social and family history as documented unless otherwise noted below. Documentation of the HPI, Physical Exam and Medical Decision Making performed by medical students or scribes is based on my personal performance of the HPI, PE and MDM. I personally evaluated and examined the patient in conjunction with the APC and agree with the assessment, treatment plan, and disposition of the patient as recorded by the APC.

## 2024-04-14 NOTE — ED NOTES
Writer walked into patient's room to find patient agitated and yelling at writer and family, due to having to wait for laceration repair. Writer notified resident, resident preparing for lac repair.

## 2024-04-14 NOTE — ED NOTES
Pt family at nurses station saying patient c/o SOB. SpO2 applied and HOB raised. SpO2 99% on RA. Resident notified.

## 2024-04-15 ASSESSMENT — ENCOUNTER SYMPTOMS
COUGH: 0
VOMITING: 0
SHORTNESS OF BREATH: 0
ABDOMINAL PAIN: 0
NAUSEA: 0
FACIAL SWELLING: 1

## 2024-04-15 NOTE — ED NOTES
Writer walked into room to find patient had removed IV and was at bedside getting dressed. Patient states he does not want to stay in hospital any longer and has a sober ride coming. Writer notified patient he was not able to leave hospital until sober ride arrives, given pt has ethanol of 363. Patient continued to get dressed, writer notified charge nurse and sitter was called for pt. Sitter at bedside now.

## 2024-04-15 NOTE — ED NOTES
Sober ride has arrived for patient and agrees to take patient home. Writer was a witness for patient signing AMA form, and patient was ambulatory/ A&O X4 upon exit.

## 2024-04-15 NOTE — ED NOTES
RN at bedside for conversation between patient and Dr. Babin. Pt is A/O x 4 and can verbally say the risks of leaving AMA. Pt will stay in the room until sober ride is here and seen by RN and physician.